# Patient Record
Sex: FEMALE | Race: WHITE | Employment: FULL TIME
[De-identification: names, ages, dates, MRNs, and addresses within clinical notes are randomized per-mention and may not be internally consistent; named-entity substitution may affect disease eponyms.]

---

## 2017-02-03 ENCOUNTER — OFFICE VISIT (OUTPATIENT)
Dept: FAMILY MEDICINE CLINIC | Facility: CLINIC | Age: 42
End: 2017-02-03

## 2017-02-03 VITALS
SYSTOLIC BLOOD PRESSURE: 114 MMHG | HEART RATE: 84 BPM | RESPIRATION RATE: 16 BRPM | WEIGHT: 129.9 LBS | TEMPERATURE: 99 F | BODY MASS INDEX: 23.76 KG/M2 | DIASTOLIC BLOOD PRESSURE: 66 MMHG

## 2017-02-03 DIAGNOSIS — J06.9 UPPER RESPIRATORY TRACT INFECTION, UNSPECIFIED TYPE: ICD-10-CM

## 2017-02-03 DIAGNOSIS — J01.40 ACUTE NON-RECURRENT PANSINUSITIS: Primary | ICD-10-CM

## 2017-02-03 LAB
INFLUENZA A ANTIBODY: NORMAL
INFLUENZA B ANTIBODY: NORMAL

## 2017-02-03 PROCEDURE — 87804 INFLUENZA ASSAY W/OPTIC: CPT | Performed by: NURSE PRACTITIONER

## 2017-02-03 PROCEDURE — 99213 OFFICE O/P EST LOW 20 MIN: CPT | Performed by: NURSE PRACTITIONER

## 2017-02-03 RX ORDER — AMOXICILLIN AND CLAVULANATE POTASSIUM 875; 125 MG/1; MG/1
1 TABLET, FILM COATED ORAL 2 TIMES DAILY
Qty: 20 TABLET | Refills: 0 | Status: SHIPPED | OUTPATIENT
Start: 2017-02-03 | End: 2018-01-08 | Stop reason: SDUPTHER

## 2017-02-03 ASSESSMENT — ENCOUNTER SYMPTOMS
SINUS PAIN: 1
VOMITING: 0
SORE THROAT: 1
DIARRHEA: 0
RHINORRHEA: 1
COUGH: 1
NAUSEA: 0

## 2017-02-06 ASSESSMENT — ENCOUNTER SYMPTOMS
SHORTNESS OF BREATH: 0
EYE ITCHING: 0
EYE PAIN: 0
SINUS PRESSURE: 1
WHEEZING: 0
ABDOMINAL PAIN: 0

## 2017-02-14 ENCOUNTER — TELEPHONE (OUTPATIENT)
Dept: FAMILY MEDICINE CLINIC | Facility: CLINIC | Age: 42
End: 2017-02-14

## 2017-02-15 ENCOUNTER — TELEPHONE (OUTPATIENT)
Dept: FAMILY MEDICINE CLINIC | Facility: CLINIC | Age: 42
End: 2017-02-15

## 2017-02-15 DIAGNOSIS — B96.89 ACUTE BACTERIAL RHINOSINUSITIS: Primary | ICD-10-CM

## 2017-02-15 DIAGNOSIS — J01.90 ACUTE BACTERIAL RHINOSINUSITIS: Primary | ICD-10-CM

## 2017-02-16 RX ORDER — AZITHROMYCIN 250 MG/1
TABLET, FILM COATED ORAL
Qty: 6 TABLET | Refills: 0 | Status: SHIPPED | OUTPATIENT
Start: 2017-02-16 | End: 2017-02-26

## 2018-01-08 ENCOUNTER — OFFICE VISIT (OUTPATIENT)
Dept: FAMILY MEDICINE CLINIC | Age: 43
End: 2018-01-08
Payer: COMMERCIAL

## 2018-01-08 VITALS
HEIGHT: 62 IN | HEART RATE: 66 BPM | WEIGHT: 129.6 LBS | TEMPERATURE: 97.8 F | SYSTOLIC BLOOD PRESSURE: 112 MMHG | DIASTOLIC BLOOD PRESSURE: 77 MMHG | BODY MASS INDEX: 23.85 KG/M2 | RESPIRATION RATE: 20 BRPM

## 2018-01-08 DIAGNOSIS — J01.40 ACUTE NON-RECURRENT PANSINUSITIS: Primary | ICD-10-CM

## 2018-01-08 DIAGNOSIS — R05.9 COUGH: ICD-10-CM

## 2018-01-08 PROCEDURE — 99213 OFFICE O/P EST LOW 20 MIN: CPT | Performed by: NURSE PRACTITIONER

## 2018-01-08 RX ORDER — AMOXICILLIN AND CLAVULANATE POTASSIUM 875; 125 MG/1; MG/1
1 TABLET, FILM COATED ORAL 2 TIMES DAILY
Qty: 20 TABLET | Refills: 0 | Status: SHIPPED | OUTPATIENT
Start: 2018-01-08 | End: 2018-01-18

## 2018-01-08 RX ORDER — BENZONATATE 100 MG/1
100 CAPSULE ORAL EVERY 6 HOURS PRN
Qty: 30 CAPSULE | Refills: 0 | Status: SHIPPED | OUTPATIENT
Start: 2018-01-08 | End: 2018-01-18

## 2018-01-08 ASSESSMENT — ENCOUNTER SYMPTOMS
SINUS PRESSURE: 1
EYES NEGATIVE: 1
SINUS PAIN: 1
SHORTNESS OF BREATH: 0
COUGH: 1
DIARRHEA: 0
HOARSE VOICE: 1
SORE THROAT: 1
ABDOMINAL PAIN: 0
NAUSEA: 0

## 2018-01-08 ASSESSMENT — PATIENT HEALTH QUESTIONNAIRE - PHQ9
SUM OF ALL RESPONSES TO PHQ9 QUESTIONS 1 & 2: 0
SUM OF ALL RESPONSES TO PHQ QUESTIONS 1-9: 0
2. FEELING DOWN, DEPRESSED OR HOPELESS: 0
1. LITTLE INTEREST OR PLEASURE IN DOING THINGS: 0

## 2018-01-08 NOTE — PROGRESS NOTES
Subjective:      Patient ID: Anastacia Richards is a 38 y.o. female. Visit Information    Have you changed or started any medications since your last visit including any over-the-counter medicines, vitamins, or herbal medicines? no   Are you having any side effects from any of your medications? -  no  Have you stopped taking any of your medications? Is so, why? -  no    Have you seen any other physician or provider since your last visit? No  Have you had any other diagnostic tests since your last visit? No  Have you been seen in the emergency room and/or had an admission to a hospital since we last saw you? No    Have you activated your [facility] account? If not, what are your barriers? Yes     Patient Care Team:  Valentino Mayers, MD as PCP - General (Pediatrics)    Medical History Review  Past Medical, Family, and Social History reviewed and does contribute to the patient presenting condition    Health Maintenance   Topic Date Due    HIV screen  08/27/1988    Lipid screen  08/27/2013    Cervical cancer screen  03/01/2014    TSH testing  09/24/2015    DTaP/Tdap/Td vaccine (1 - Tdap) 11/01/2018 (Originally 8/27/1992)    Flu vaccine (1) 11/01/2018 (Originally 9/1/2017)       Sinusitis   This is a new problem. The current episode started 1 to 4 weeks ago. The problem has been gradually worsening since onset. There has been no fever. The pain is mild. Associated symptoms include congestion, coughing, ear pain, headaches, a hoarse voice, sinus pressure and a sore throat (from coughing). Pertinent negatives include no chills or shortness of breath (a little). Treatments tried: sudafed and mucinex. The treatment provided mild relief. Review of Systems   Constitutional: Positive for activity change and fatigue. Negative for appetite change and chills. HENT: Positive for congestion, ear pain, hoarse voice, postnasal drip, sinus pain, sinus pressure and sore throat (from coughing). Eyes: Negative.     Respiratory: Positive for cough. Negative for shortness of breath (a little). Cardiovascular: Negative for chest pain and palpitations. Gastrointestinal: Negative for abdominal pain, diarrhea and nausea. Genitourinary: Negative. Skin: Negative for rash. Neurological: Positive for headaches. Negative for dizziness and syncope. Psychiatric/Behavioral: Positive for sleep disturbance. Objective:   Physical Exam   Constitutional: She is oriented to person, place, and time. She appears well-developed. No distress. HENT:   Head: Normocephalic and atraumatic. Right Ear: Hearing, tympanic membrane, external ear and ear canal normal.   Left Ear: Hearing, tympanic membrane, external ear and ear canal normal.   Nose: Mucosal edema, rhinorrhea and sinus tenderness present. Right sinus exhibits maxillary sinus tenderness. Right sinus exhibits no frontal sinus tenderness. Left sinus exhibits maxillary sinus tenderness. Left sinus exhibits no frontal sinus tenderness. Mouth/Throat: Uvula is midline, oropharynx is clear and moist and mucous membranes are normal. No oropharyngeal exudate. Eyes: Conjunctivae are normal. Pupils are equal, round, and reactive to light. Right eye exhibits no discharge. Left eye exhibits no discharge. Neck: Neck supple. Cardiovascular: Normal rate, regular rhythm and normal heart sounds. Pulmonary/Chest: Effort normal and breath sounds normal. No respiratory distress. She has no wheezes. She has no rhonchi. Dry cough in office today   Lymphadenopathy:     She has no cervical adenopathy. Neurological: She is alert and oriented to person, place, and time. Skin: Skin is warm and dry. Psychiatric: She has a normal mood and affect. Her behavior is normal.   Nursing note and vitals reviewed. Assessment:      1. Acute non-recurrent pansinusitis  amoxicillin-clavulanate (AUGMENTIN) 875-125 MG per tablet   2.  Cough  benzonatate (TESSALON PERLES) 100 MG capsule         Plan:

## 2018-01-08 NOTE — PATIENT INSTRUCTIONS
hot, wet towel or a warm gel pack on your face 3 or 4 times a day for 5 to 10 minutes each time. · Try a decongestant nasal spray like oxymetazoline (Afrin). Do not use it for more than 3 days in a row. Using it for more than 3 days can make your congestion worse. When should you call for help? Call your doctor now or seek immediate medical care if:  ? · You have new or worse swelling or redness in your face or around your eyes. ? · You have a new or higher fever. ? Watch closely for changes in your health, and be sure to contact your doctor if:  ? · You have new or worse facial pain. ? · The mucus from your nose becomes thicker (like pus) or has new blood in it. ? · You are not getting better as expected. Where can you learn more? Go to https://Joustpepiceweb.SageQuest. org and sign in to your [employer] account. Enter P793 in the Meizu box to learn more about \"Sinusitis: Care Instructions. \"     If you do not have an account, please click on the \"Sign Up Now\" link. Current as of: May 12, 2017  Content Version: 11.5  © 3057-9113 [employer]. Care instructions adapted under license by [facility] ([facility]). If you have questions about a medical condition or this instruction, always ask your healthcare professional. [place] [address] any warranty or liability for your use of this information. Patient Education        Saline Nasal Washes: Care Instructions  Your Care Instructions  Saline nasal washes help keep the nasal passages open by washing out thick or dried mucus. This simple remedy can help relieve symptoms of allergies, sinusitis, and colds. It also can make the nose feel more comfortable by keeping the mucous membranes moist. You may notice a little burning sensation in your nose the first few times you use the solution, but this usually gets better in a few days. Follow-up care is a key part of your treatment and safety.  Be sure to make and go to all appointments, and call your doctor if you are having problems. It's also a good idea to know your test results and keep a list of the medicines you take. How can you care for yourself at home? · You can buy premixed saline solution in a squeeze bottle or other sinus rinse products at a drugstore. Read and follow the instructions on the label. · You also can make your own saline solution by adding 1 teaspoon of salt and 1 teaspoon of baking soda to 2 cups of distilled water. · If you use a homemade solution, pour a small amount into a clean bowl. Using a rubber bulb syringe, squeeze the syringe and place the tip in the salt water. Pull a small amount of the salt water into the syringe by relaxing your hand. · Sit down with your head tilted slightly back. Do not lie down. Put the tip of the bulb syringe or the squeeze bottle a little way into one of your nostrils. Gently drip or squirt a few drops into the nostril. Repeat with the other nostril. Some sneezing and gagging are normal at first.  · Gently blow your nose. · Wipe the syringe or bottle tip clean after each use. · Repeat this 2 or 3 times a day. · Use nasal washes gently if you have nosebleeds often. When should you call for help? Watch closely for changes in your health, and be sure to contact your doctor if:  ? · You often get nosebleeds. ? · You have problems doing the nasal washes. Where can you learn more? Go to https://SiTimepeconcetta.LoanHero. org and sign in to your Re-Compose account. Enter [phone] [id number] [phone] in the [employer] box to learn more about \"Saline Nasal Washes: Care Instructions. \"     If you do not have an account, please click on the \"Sign Up Now\" link. Current as of: May 12, 2017  Content Version: 11.5  © 2575-0032 [employer]orporated. Care instructions adapted under license by [facility] ([facility]).  If you have questions about a medical condition or this instruction, always ask your healthcare professional. [employer] disclaims any warranty or liability for your use of this information.

## 2018-01-22 ENCOUNTER — TELEPHONE (OUTPATIENT)
Dept: FAMILY MEDICINE CLINIC | Age: 43
End: 2018-01-22

## 2018-01-22 DIAGNOSIS — Z20.828 EXPOSURE TO INFLUENZA: Primary | ICD-10-CM

## 2018-01-22 RX ORDER — OSELTAMIVIR PHOSPHATE 75 MG/1
75 CAPSULE ORAL DAILY
Qty: 10 CAPSULE | Refills: 0 | Status: SHIPPED | OUTPATIENT
Start: 2018-01-22 | End: 2018-02-01

## 2018-09-13 ENCOUNTER — OFFICE VISIT (OUTPATIENT)
Dept: FAMILY MEDICINE CLINIC | Age: 43
End: 2018-09-13
Payer: COMMERCIAL

## 2018-09-13 ENCOUNTER — HOSPITAL ENCOUNTER (OUTPATIENT)
Age: 43
Setting detail: SPECIMEN
Discharge: HOME | End: 2018-09-13
Payer: COMMERCIAL

## 2018-09-13 VITALS
WEIGHT: 133 LBS | BODY MASS INDEX: 24.33 KG/M2 | RESPIRATION RATE: 18 BRPM | HEART RATE: 72 BPM | SYSTOLIC BLOOD PRESSURE: 122 MMHG | TEMPERATURE: 97.8 F | DIASTOLIC BLOOD PRESSURE: 78 MMHG

## 2018-09-13 DIAGNOSIS — F41.9 ANXIETY AND DEPRESSION: ICD-10-CM

## 2018-09-13 DIAGNOSIS — B37.31 YEAST VAGINITIS: Primary | ICD-10-CM

## 2018-09-13 DIAGNOSIS — E89.0 HYPOTHYROIDISM FOLLOWING RADIOIODINE THERAPY: ICD-10-CM

## 2018-09-13 DIAGNOSIS — N89.8 VAGINAL DISCHARGE: ICD-10-CM

## 2018-09-13 DIAGNOSIS — F32.A ANXIETY AND DEPRESSION: ICD-10-CM

## 2018-09-13 DIAGNOSIS — B37.31 YEAST VAGINITIS: ICD-10-CM

## 2018-09-13 LAB
DIRECT EXAM: ABNORMAL
Lab: ABNORMAL
SPECIMEN DESCRIPTION: ABNORMAL
STATUS: ABNORMAL

## 2018-09-13 PROCEDURE — 99214 OFFICE O/P EST MOD 30 MIN: CPT | Performed by: NURSE PRACTITIONER

## 2018-09-13 RX ORDER — FLUCONAZOLE 150 MG/1
150 TABLET ORAL ONCE
Qty: 2 TABLET | Refills: 0 | Status: SHIPPED | OUTPATIENT
Start: 2018-09-13 | End: 2018-09-13

## 2018-09-13 RX ORDER — FLUOXETINE HYDROCHLORIDE 20 MG/1
20 CAPSULE ORAL DAILY
Qty: 30 CAPSULE | Refills: 3 | Status: SHIPPED | OUTPATIENT
Start: 2018-09-13

## 2018-09-13 NOTE — PATIENT INSTRUCTIONS
play a relaxation tape while you drive a car. When should you call for help? Call 911 anytime you think you may need emergency care. For example, call if:    · You feel you cannot stop from hurting yourself or someone else.   Amari Garcia the numbers for these national suicide hotlines: 5-142-528-TALK (5-565.453.2830) and 8-012-NNWIEIK (0-568.512.4737). If you or someone you know talks about suicide or feeling hopeless, get help right away.   Watch closely for changes in your health, and be sure to contact your doctor if:    · You have anxiety or fear that affects your life.     · You have symptoms of anxiety that are new or different from those you had before. Where can you learn more? Go to https://University of Rochesterpericardoeweb.BlockTrail. org and sign in to your [employer] account. Enter P754 in the Schoolwires box to learn more about \"Anxiety Disorder: Care Instructions. \"     If you do not have an account, please click on the \"Sign Up Now\" link. Current as of: December 7, 2017  Content Version: 11.7  © 0811-1372 [employer]. Care instructions adapted under license by [facility] ([facility]). If you have questions about a medical condition or this instruction, always ask your healthcare professional. [address] any warranty or liability for your use of this information. Patient Education        Vaginal Yeast Infection: Care Instructions  Your Care Instructions    A vaginal yeast infection is caused by too many yeast cells in the vagina. This is common in women of all ages. Itching, vaginal discharge and irritation, and other symptoms can bother you. But yeast infections don't often cause other health problems. Some medicines can increase your risk of getting a yeast infection. These include antibiotics, birth control pills, hormones, and steroids. You may also be more likely to get a yeast infection if you are pregnant, have diabetes, douche, or wear tight clothes.   With treatment, most yeast infections get better in 2 to 3 days. Follow-up care is a key part of your treatment and safety. Be sure to make and go to all appointments, and call your doctor if you are having problems. It's also a good idea to know your test results and keep a list of the medicines you take. How can you care for yourself at home? · Take your medicines exactly as prescribed. Call your doctor if you think you are having a problem with your medicine. · Ask your doctor about over-the-counter (OTC) medicines for yeast infections. They may cost less than prescription medicines. If you use an OTC treatment, read and follow all instructions on the label. · Do not use tampons while using a vaginal cream or suppository. The tampons can absorb the medicine. Use pads instead. · Wear loose cotton clothing. Do not wear nylon or other fabric that holds body heat and moisture close to the skin. · Try sleeping without underwear. · Do not scratch. Relieve itching with a cold pack or a cool bath. · Do not wash your vaginal area more than once a day. Use plain water or a mild, unscented soap. Air-dry the vaginal area. · Change out of wet swimsuits after swimming. · Do not have sex until you have finished your treatment. · Do not douche. When should you call for help? Call your doctor now or seek immediate medical care if:    · You have unexpected vaginal bleeding.     · You have new or increased pain in your vagina or pelvis.    Watch closely for changes in your health, and be sure to contact your doctor if:    · You have a fever.     · You are not getting better after 2 days.     · Your symptoms come back after you finish your medicines. Where can you learn more? Go to https://Reframed.tvpepiceweb.Arriendas.cl. org and sign in to your [employer]ris account. Enter O970 in the Dreamzer Games box to learn more about \"Vaginal Yeast Infection: Care Instructions. \"     If you do not have an account, please click on the \"Sign Up Now\" link. Current as of: October 6, 2017  Content Version: 11.7  © 6897-0466 [employer]. Care instructions adapted under license by [address] If you have questions about a medical condition or this instruction, always ask your healthcare professional. [address] any warranty or liability for your use of this information.

## 2018-09-13 NOTE — PROGRESS NOTES
tablet by mouth 2 times daily for 7 days 14 tablet 0     No current facility-administered medications for this visit. HPI    Presents to the office concerns regarding vaginal discharge that started around 1.5 weeks ago. Denies any exposure to sexually transmitted infections. Denies any pelvic pain, back pain. Mild increase in vaginal odor. No abnormal bleeding. Does report an increase in the recent anxiety symptoms. States that she has had anxiety for quite some time, seems to be worse now. Would like to try something to help with this. Denies any thoughts of suicide or self-harm. Does have a history of hypothyroidism. Sees Dr. Joe Araya for endo. Did have recent labs completed. Reports thyroid is stable on current dose of medication. dwj    Review of Systems   Constitutional: Negative for chills, fatigue, fever and unexpected weight change. HENT: Negative for congestion, ear pain, postnasal drip, rhinorrhea and sore throat. Respiratory: Negative for cough, chest tightness, shortness of breath and wheezing. Cardiovascular: Negative for chest pain and palpitations. Gastrointestinal: Negative for abdominal pain, constipation, diarrhea, nausea and vomiting. Endocrine: Negative for polydipsia and polyuria. Hypothyroidism-follows with endocrinology   Genitourinary: Positive for vaginal discharge. Negative for dysuria, frequency, hematuria and urgency. Musculoskeletal: Negative for back pain, gait problem, myalgias and neck stiffness. Skin: Negative for rash and wound. Allergic/Immunologic: Negative for environmental allergies and food allergies. Neurological: Negative for dizziness, seizures, syncope, weakness and headaches. Hematological: Negative for adenopathy. Psychiatric/Behavioral: Negative for dysphoric mood, self-injury and suicidal ideas. The patient is nervous/anxious. Objective:   Physical Exam   Constitutional: She is oriented to person, place, and time. reviewed. Assessment / Plan:     1. Yeast vaginitis  Pelvic exam complete, tolerated well  Start Diflucan. Await for vaginitis DNA probe results to treat for possible bacterial vaginosis. Continue to monitor symptoms. Follow-up in office as needed. - VAGINITIS DNA PROBE; Future  - fluconazole (DIFLUCAN) 150 MG tablet; Take 1 tablet by mouth once for 1 dose May repeat in 72 hours if needed  Dispense: 2 tablet; Refill: 0    2. Vaginal discharge  Pelvic exam complete, tolerated well  Start Diflucan. Await for vaginitis DNA probe results to treat for possible bacterial vaginosis. Continue to monitor symptoms. Follow-up in office as needed. - VAGINITIS DNA PROBE; Future  - fluconazole (DIFLUCAN) 150 MG tablet; Take 1 tablet by mouth once for 1 dose May repeat in 72 hours if needed  Dispense: 2 tablet; Refill: 0    3. Anxiety and depression  Trial daily Prozac. Risks/benefits of medication discussed. Monitor for worsening symptoms-see community medical attention with any thoughts of suicide or self-harm. Return to office in 1 month for evaluation.  - FLUoxetine (PROZAC) 20 MG capsule; Take 1 capsule by mouth daily  Dispense: 30 capsule; Refill: 3    4. Hypothyroidism following radioiodine therapy  Stable, continue current medications, continue monitoring. Maintain follow-up appointment with endocrinology as planned. Obtain records from recent endocrinology visit    Encouraged dietary, exercise, continue current medications. Maintain up appointments as planned. Recommend influenza vaccine in the next few weeks. Return in about 4 weeks (around 10/11/2018), or if symptoms worsen or fail to improve, for anxiety/depression med check. William De aL Cruz received counseling on the following healthy behaviors: nutrition, exercise, medication adherence and tobacco cessation  Reviewed prior labs and health maintenance. Continue current medications, diet and exercise.   Discussed use, benefit, and side effects of prescribed medications. Barriers to medication compliance addressed. Patient given educational materials - see patient instructions. All patient questions answered. Patient voiced understanding.        Electronically signed by OLI Mendiola CNP on 9/19/2018 at 1:33 PM

## 2018-09-14 ENCOUNTER — TELEPHONE (OUTPATIENT)
Dept: FAMILY MEDICINE CLINIC | Age: 43
End: 2018-09-14

## 2018-09-14 DIAGNOSIS — B96.89 BV (BACTERIAL VAGINOSIS): Primary | ICD-10-CM

## 2018-09-14 DIAGNOSIS — N76.0 BV (BACTERIAL VAGINOSIS): Primary | ICD-10-CM

## 2018-09-14 RX ORDER — METRONIDAZOLE 500 MG/1
500 TABLET ORAL 2 TIMES DAILY
Qty: 14 TABLET | Refills: 0 | Status: SHIPPED | OUTPATIENT
Start: 2018-09-14 | End: 2018-09-21

## 2018-09-19 ASSESSMENT — ENCOUNTER SYMPTOMS
SORE THROAT: 0
DIARRHEA: 0
SHORTNESS OF BREATH: 0
CHEST TIGHTNESS: 0
BACK PAIN: 0
WHEEZING: 0
VOMITING: 0
COUGH: 0
CONSTIPATION: 0
ABDOMINAL PAIN: 0
RHINORRHEA: 0
NAUSEA: 0

## 2018-12-13 ENCOUNTER — E-VISIT (OUTPATIENT)
Dept: FAMILY MEDICINE CLINIC | Age: 43
End: 2018-12-13
Payer: COMMERCIAL

## 2018-12-13 DIAGNOSIS — J01.90 ACUTE BACTERIAL SINUSITIS: Primary | ICD-10-CM

## 2018-12-13 DIAGNOSIS — B96.89 ACUTE BACTERIAL SINUSITIS: Primary | ICD-10-CM

## 2018-12-13 PROCEDURE — 99444 PR PHYSICIAN ONLINE EVALUATION & MANAGEMENT SERVICE: CPT | Performed by: FAMILY MEDICINE

## 2018-12-13 RX ORDER — AMOXICILLIN AND CLAVULANATE POTASSIUM 875; 125 MG/1; MG/1
1 TABLET, FILM COATED ORAL 2 TIMES DAILY WITH MEALS
Qty: 20 TABLET | Refills: 0 | Status: SHIPPED | OUTPATIENT
Start: 2018-12-13 | End: 2018-12-23

## 2018-12-13 ASSESSMENT — LIFESTYLE VARIABLES: SMOKING_STATUS: NO

## 2019-03-13 ENCOUNTER — E-VISIT (OUTPATIENT)
Dept: FAMILY MEDICINE CLINIC | Age: 44
End: 2019-03-13
Payer: COMMERCIAL

## 2019-03-13 DIAGNOSIS — J01.90 ACUTE BACTERIAL SINUSITIS: Primary | ICD-10-CM

## 2019-03-13 DIAGNOSIS — B96.89 ACUTE BACTERIAL SINUSITIS: Primary | ICD-10-CM

## 2019-03-13 PROCEDURE — 98969 PR NONPHYSICIAN ONLINE ASSESSMENT AND MANAGEMENT: CPT | Performed by: NURSE PRACTITIONER

## 2019-03-13 RX ORDER — AMOXICILLIN AND CLAVULANATE POTASSIUM 875; 125 MG/1; MG/1
1 TABLET, FILM COATED ORAL 2 TIMES DAILY WITH MEALS
Qty: 20 TABLET | Refills: 0 | Status: SHIPPED | OUTPATIENT
Start: 2019-03-13 | End: 2019-03-23

## 2019-03-13 ASSESSMENT — LIFESTYLE VARIABLES: SMOKING_STATUS: NO, I'VE NEVER SMOKED

## 2020-03-09 ENCOUNTER — E-VISIT (OUTPATIENT)
Dept: INTERNAL MEDICINE CLINIC | Age: 45
End: 2020-03-09
Payer: COMMERCIAL

## 2020-03-09 PROCEDURE — 99421 OL DIG E/M SVC 5-10 MIN: CPT | Performed by: INTERNAL MEDICINE

## 2020-03-09 RX ORDER — AMOXICILLIN AND CLAVULANATE POTASSIUM 875; 125 MG/1; MG/1
1 TABLET, FILM COATED ORAL 2 TIMES DAILY
Qty: 14 TABLET | Refills: 0 | Status: SHIPPED | OUTPATIENT
Start: 2020-03-09 | End: 2020-03-16

## 2020-03-09 ASSESSMENT — LIFESTYLE VARIABLES: SMOKING_STATUS: NO, I'VE NEVER SMOKED

## 2020-08-17 ENCOUNTER — OFFICE VISIT (OUTPATIENT)
Dept: FAMILY MEDICINE CLINIC | Age: 45
End: 2020-08-17
Payer: COMMERCIAL

## 2020-08-17 VITALS
SYSTOLIC BLOOD PRESSURE: 94 MMHG | DIASTOLIC BLOOD PRESSURE: 60 MMHG | WEIGHT: 133 LBS | TEMPERATURE: 97.3 F | OXYGEN SATURATION: 98 % | BODY MASS INDEX: 24.48 KG/M2 | HEIGHT: 62 IN | HEART RATE: 72 BPM

## 2020-08-17 PROCEDURE — 99213 OFFICE O/P EST LOW 20 MIN: CPT | Performed by: NURSE PRACTITIONER

## 2020-08-17 SDOH — ECONOMIC STABILITY: FOOD INSECURITY: WITHIN THE PAST 12 MONTHS, YOU WORRIED THAT YOUR FOOD WOULD RUN OUT BEFORE YOU GOT MONEY TO BUY MORE.: NEVER TRUE

## 2020-08-17 SDOH — ECONOMIC STABILITY: INCOME INSECURITY: HOW HARD IS IT FOR YOU TO PAY FOR THE VERY BASICS LIKE FOOD, HOUSING, MEDICAL CARE, AND HEATING?: NOT HARD AT ALL

## 2020-08-17 SDOH — ECONOMIC STABILITY: FOOD INSECURITY: WITHIN THE PAST 12 MONTHS, THE FOOD YOU BOUGHT JUST DIDN'T LAST AND YOU DIDN'T HAVE MONEY TO GET MORE.: NEVER TRUE

## 2020-08-17 SDOH — ECONOMIC STABILITY: TRANSPORTATION INSECURITY
IN THE PAST 12 MONTHS, HAS THE LACK OF TRANSPORTATION KEPT YOU FROM MEDICAL APPOINTMENTS OR FROM GETTING MEDICATIONS?: NO

## 2020-08-17 SDOH — ECONOMIC STABILITY: TRANSPORTATION INSECURITY
IN THE PAST 12 MONTHS, HAS LACK OF TRANSPORTATION KEPT YOU FROM MEETINGS, WORK, OR FROM GETTING THINGS NEEDED FOR DAILY LIVING?: NO

## 2020-08-17 ASSESSMENT — ENCOUNTER SYMPTOMS
ROS SKIN COMMENTS: LUMP IN LEFT BREAST
COUGH: 0
COLOR CHANGE: 0
SHORTNESS OF BREATH: 0

## 2020-08-17 ASSESSMENT — PATIENT HEALTH QUESTIONNAIRE - PHQ9
1. LITTLE INTEREST OR PLEASURE IN DOING THINGS: 0
SUM OF ALL RESPONSES TO PHQ QUESTIONS 1-9: 0
SUM OF ALL RESPONSES TO PHQ QUESTIONS 1-9: 0
2. FEELING DOWN, DEPRESSED OR HOPELESS: 0
SUM OF ALL RESPONSES TO PHQ9 QUESTIONS 1 & 2: 0

## 2020-08-17 NOTE — PROGRESS NOTES
Subjective:      Visit Information    Have you changed or started any medications since your last visit including any over-the-counter medicines, vitamins, or herbal medicines? no   Are you having any side effects from any of your medications? -  no  Have you stopped taking any of your medications? Is so, why? -  no    Have you seen any other physician or provider since your last visit? No  Have you had any other diagnostic tests since your last visit? No  Have you been seen in the emergency room and/or had an admission to a hospital since we last saw you? No  Have you had your routine dental cleaning in the past 6 months? no    Have you activated your tweetTV account? If not, what are your barriers? Yes     Patient Care Team:  Daniella Thacker MD as PCP - General    Medical History Review  Past Medical, Family, and Social History reviewed and does contribute to the patient presenting condition    Health Maintenance   Topic Date Due    Lipid screen  08/27/2013    TSH testing  09/24/2015    Cervical cancer screen  10/09/2020 (Originally 3/1/2014)    DTaP/Tdap/Td vaccine (1 - Tdap) 08/17/2021 (Originally 8/27/1992)    Flu vaccine (1) 09/01/2020    Hepatitis A vaccine  Aged Out    Hepatitis B vaccine  Aged Out    Hib vaccine  Aged Out    Meningococcal (ACWY) vaccine  Aged Out    Pneumococcal 0-64 years Vaccine  Aged Out    HIV screen  Discontinued       Current Outpatient Medications   Medication Sig Dispense Refill    levothyroxine (SYNTHROID) 112 MCG tablet Take 112 mcg by mouth daily.  FLUoxetine (PROZAC) 20 MG capsule Take 1 capsule by mouth daily (Patient not taking: Reported on 8/17/2020) 30 capsule 3     No current facility-administered medications for this visit. Patient ID: Angie Whittaker is a 53 y.o. female. Patient presents in office today with concerns about lump in her left breast. Noticed on Thursday. Unchanged since last week. Non-tender.  Breasts gross her out so stopped touching it. LMP- ended last week. Usually has cycle every 3 weeks. Has only ever had 1 mammogram but it has been several years. Review of Systems   Constitutional: Negative for activity change, appetite change and fever. Respiratory: Negative for cough and shortness of breath. Cardiovascular: Negative for chest pain. Skin: Negative for color change and rash. Lump in left breast   Psychiatric/Behavioral: Negative for sleep disturbance. The patient is nervous/anxious. Objective:     BP 94/60   Pulse 72   Temp 97.3 °F (36.3 °C) (Temporal)   Ht 5' 2\" (1.575 m)   Wt 133 lb (60.3 kg)   LMP 08/05/2020   SpO2 98%   BMI 24.33 kg/m²      Physical Exam  Vitals signs and nursing note reviewed. Constitutional:       Appearance: Normal appearance. She is not ill-appearing or diaphoretic. HENT:      Head: Normocephalic and atraumatic. Right Ear: External ear normal.      Left Ear: External ear normal.   Neck:      Musculoskeletal: Neck supple. Cardiovascular:      Rate and Rhythm: Normal rate and regular rhythm. Pulmonary:      Effort: Pulmonary effort is normal. No respiratory distress. Breath sounds: Normal breath sounds. No wheezing. Chest:      Breasts:         Right: Normal. No swelling, bleeding, inverted nipple, mass, nipple discharge, skin change or tenderness. Left: Mass (pea sized mobile mass noted at 4 o'clock position, 3 finger breadths from nipple) present. No swelling, bleeding, inverted nipple, nipple discharge, skin change or tenderness. Lymphadenopathy:      Upper Body:      Right upper body: No axillary adenopathy. Left upper body: No axillary adenopathy. Skin:     General: Skin is warm and dry. Findings: No rash. Neurological:      Mental Status: She is alert and oriented to person, place, and time. Psychiatric:         Mood and Affect: Mood normal.         Behavior: Behavior normal.         Assessment / Plan:     1.  Lump of left breast    - TAMMIE DIGITAL DIAGNOSTIC W OR WO CAD BILATERAL; Future  - US BREAST COMPLETE LEFT; Future    Has not had mammogram in several years. Will complete diagnostic mammogram and ultrasound further evaluation. Further plan of care based on results. Call office with concerns          Samuel Sandifer received counseling on the following healthy behaviors: nutrition and exercise  Reviewed prior labs and health maintenance. Continue current medications, diet and exercise. Discussed use, benefit, and side effects of prescribed medications. Barriers to medication compliance addressed. Patient given educational materials - see patient instructions. All patient questions answered. Patient voiced understanding.        Electronically signed by OLI Bang CNP on 8/17/2020 at 4:38 PM

## 2021-04-28 ENCOUNTER — E-VISIT (OUTPATIENT)
Dept: FAMILY MEDICINE CLINIC | Age: 46
End: 2021-04-28
Payer: COMMERCIAL

## 2021-04-28 DIAGNOSIS — J06.9 ACUTE UPPER RESPIRATORY INFECTION, UNSPECIFIED: ICD-10-CM

## 2021-04-28 DIAGNOSIS — J01.90 ACUTE NON-RECURRENT SINUSITIS, UNSPECIFIED LOCATION: Primary | ICD-10-CM

## 2021-04-28 PROCEDURE — 99421 OL DIG E/M SVC 5-10 MIN: CPT | Performed by: PHYSICIAN ASSISTANT

## 2021-04-28 RX ORDER — AMOXICILLIN AND CLAVULANATE POTASSIUM 875; 125 MG/1; MG/1
1 TABLET, FILM COATED ORAL 2 TIMES DAILY
Qty: 20 TABLET | Refills: 0 | Status: SHIPPED | OUTPATIENT
Start: 2021-04-28 | End: 2021-05-08

## 2021-04-28 ASSESSMENT — LIFESTYLE VARIABLES: SMOKING_STATUS: NO, I'VE NEVER SMOKED

## 2021-04-28 NOTE — PROGRESS NOTES
21  [facility] : 1975 Sex: female  Age: 50 y.o. No chief complaint on file. HPI:  50 y.o. female presents for acute visit due to sinus/cough/cold. The patient has had nasal congestion that is mostly a thin and yellow/green color. The patient does have some pressure around teeth and in the face. No sore throat. No swollen glands. The patient has been sneezing, coughing. Infrequent coughing and coughing up a little bit of mucus. The patient is swallowing everything that is coughed up. No hoarseness. The patient is ears have some fluid like sensation and ache. No eye pain, body aches, stiff neck. No nausea, vomiting, dizziness. No swelling of the mouth, tongue, or difficulty swallowing. No shortness of breath or chest pain. No rashes. Patient thought that this was related to allergies but is been 7 days and not improving. Patient is not a smoker. No significant past medical conditions. Has not been recently rehospitalized. The patient has been exposed to other people with similar symptoms. The patient has had similar presentation in the past.  The patient is not having any fevers or chills. History:    Current Outpatient Medications:     FLUoxetine (PROZAC) 20 MG capsule, Take 1 capsule by mouth daily (Patient not taking: Reported on 2020), Disp: 30 capsule, Rfl: 3    levothyroxine (SYNTHROID) 112 MCG tablet, Take 112 mcg by mouth daily. , Disp: , Rfl:     No Known Allergies    Past Medical History:   Diagnosis Date    Allergic rhinitis     Hypothyroidism following radioiodine therapy     Sinusitis      No past surgical history on file. No family history on file. Social History     Tobacco History     Smoking Status  Former Smoker    Smokeless Tobacco Use  Never Used            No vital signs for this E-visit    Past medical history reviewed. Allergies reviewed. Medications reviewed.     Reviewing the patient's questionnaire this does seem to be consistent with a sinus type infection. Is been ongoing for a week. We will treat the patient with Augmentin. We will update the patient via Rambus message. The patient is to push fluids get plenty of rest.  We will have the patient follow-up for repeat evaluation with PCP if the symptoms do not improve in for formal testing. Patient may use over-the-counter decongestant, nasal spray. Use probiotics. Assessment and Plan:  Diagnoses and all orders for this visit:    Acute non-recurrent sinusitis, unspecified location    Acute upper respiratory infection, unspecified    Other orders  -     amoxicillin-clavulanate (AUGMENTIN) 875-125 MG per tablet; Take 1 tablet by mouth 2 times daily for 10 days          Micky Handley IIIma  4/28/21  12:58 PM 5-10 minutes were spent on the digital evaluation and management of this patient.

## 2022-05-23 ENCOUNTER — E-VISIT (OUTPATIENT)
Dept: PRIMARY CARE CLINIC | Age: 47
End: 2022-05-23
Payer: COMMERCIAL

## 2022-05-23 DIAGNOSIS — J06.9 UPPER RESPIRATORY TRACT INFECTION, UNSPECIFIED TYPE: Primary | ICD-10-CM

## 2022-05-23 PROCEDURE — 99213 OFFICE O/P EST LOW 20 MIN: CPT | Performed by: NURSE PRACTITIONER

## 2022-05-23 RX ORDER — AMOXICILLIN AND CLAVULANATE POTASSIUM 875; 125 MG/1; MG/1
1 TABLET, FILM COATED ORAL 2 TIMES DAILY
Qty: 20 TABLET | Refills: 0 | Status: SHIPPED | OUTPATIENT
Start: 2022-05-23 | End: 2022-06-02

## 2022-05-23 ASSESSMENT — LIFESTYLE VARIABLES: SMOKING_STATUS: NO, I'VE NEVER SMOKED

## 2022-12-08 ENCOUNTER — OFFICE VISIT (OUTPATIENT)
Dept: PRIMARY CARE CLINIC | Age: 47
End: 2022-12-08
Payer: COMMERCIAL

## 2022-12-08 VITALS
DIASTOLIC BLOOD PRESSURE: 62 MMHG | OXYGEN SATURATION: 97 % | BODY MASS INDEX: 27.07 KG/M2 | HEART RATE: 68 BPM | SYSTOLIC BLOOD PRESSURE: 102 MMHG | WEIGHT: 148 LBS | TEMPERATURE: 98.9 F

## 2022-12-08 DIAGNOSIS — B34.9 VIRAL ILLNESS: Primary | ICD-10-CM

## 2022-12-08 DIAGNOSIS — R52 BODY ACHES: ICD-10-CM

## 2022-12-08 DIAGNOSIS — R05.1 ACUTE COUGH: ICD-10-CM

## 2022-12-08 LAB
INFLUENZA A ANTIBODY: NEGATIVE
INFLUENZA B ANTIBODY: NEGATIVE

## 2022-12-08 PROCEDURE — 99213 OFFICE O/P EST LOW 20 MIN: CPT | Performed by: NURSE PRACTITIONER

## 2022-12-08 PROCEDURE — 87804 INFLUENZA ASSAY W/OPTIC: CPT | Performed by: NURSE PRACTITIONER

## 2022-12-08 ASSESSMENT — ENCOUNTER SYMPTOMS
SHORTNESS OF BREATH: 0
EYE REDNESS: 0
SORE THROAT: 0
NAUSEA: 0
CHEST TIGHTNESS: 0
EYE DISCHARGE: 0
DIARRHEA: 0
COUGH: 1
CONSTIPATION: 0
ABDOMINAL PAIN: 0
VOMITING: 0
VOICE CHANGE: 0
WHEEZING: 0
RHINORRHEA: 1
SINUS PRESSURE: 1
EYE PAIN: 0
TROUBLE SWALLOWING: 0

## 2022-12-08 NOTE — PROGRESS NOTES
[address]. [place]  [address] [facility] [address]  Dept: [phone]    Ashley Vogel is a 50 y.o. female Established patient, who presents to the walk-in clinic today with conditions/complaints as noted below:    Chief Complaint   Patient presents with    Cough     Cough, nasal congestion/drainage, headache, body aches, chills, and right ear pain for 3 days. Denies sore throat, sob, chest pain, nausea, vomiting, or diarrhea. HPI:     Patient presents to walk in clinic with concerns about cough, body aches, headache and nasal drainage. Symptoms started Monday night. Was fine all day Monday then hit her hard Monday night. Has not checked her temperature but has felt warm and has had chills and sweats. Tested herself at home for Covid on Tuesday which was negative. Cough  This is a new problem. The current episode started in the past 7 days. Associated symptoms include chills, ear pain (right), a fever, headaches, myalgias, nasal congestion, postnasal drip, rhinorrhea and sweats. Pertinent negatives include no chest pain, eye redness, rash, sore throat, shortness of breath or wheezing. She has tried nothing for the symptoms. Her past medical history is significant for environmental allergies. Past Medical History:   Diagnosis Date    Allergic rhinitis     Hypothyroidism following radioiodine therapy     Sinusitis        Current Outpatient Medications   Medication Sig Dispense Refill    levothyroxine (SYNTHROID) 100 MCG tablet Take by mouth daily      FLUoxetine (PROZAC) 20 MG capsule Take 1 capsule by mouth daily (Patient not taking: No sig reported) 30 capsule 3     No current facility-administered medications for this visit. No Known Allergies    :     Review of Systems   Constitutional:  Positive for activity change, appetite change, chills, diaphoresis and fever.    HENT:  Positive for congestion, ear pain (right), postnasal drip, rhinorrhea and sinus pressure. Negative for ear discharge, sore throat, trouble swallowing and voice change. Eyes:  Negative for pain, discharge, redness and visual disturbance. Respiratory:  Positive for cough. Negative for chest tightness, shortness of breath and wheezing. Cardiovascular:  Negative for chest pain. Gastrointestinal:  Negative for abdominal pain, constipation, diarrhea, nausea and vomiting. Musculoskeletal:  Positive for myalgias. Skin:  Negative for rash. Allergic/Immunologic: Positive for environmental allergies. Neurological:  Positive for headaches. Psychiatric/Behavioral:  Positive for sleep disturbance.      :     /62 (Site: Left Upper Arm, Position: Sitting, Cuff Size: Medium Adult)   Pulse 68   Temp 98.9 °F (37.2 °C) (Tympanic)   Wt 148 lb (67.1 kg)   LMP 11/29/2022 (Approximate)   SpO2 97%   BMI 27.07 kg/m²     Physical Exam  Vitals and nursing note reviewed. Constitutional:       General: She is not in acute distress. Appearance: Normal appearance. She is ill-appearing. HENT:      Head: Normocephalic and atraumatic. Right Ear: Tympanic membrane and external ear normal.      Left Ear: Tympanic membrane and external ear normal.      Nose: Mucosal edema present. Right Sinus: Maxillary sinus tenderness and frontal sinus tenderness present. Left Sinus: Maxillary sinus tenderness and frontal sinus tenderness present. Mouth/Throat:      Mouth: Mucous membranes are moist.      Pharynx: Posterior oropharyngeal erythema present. No pharyngeal swelling or oropharyngeal exudate. Eyes:      General:         Right eye: No discharge. Left eye: No discharge. Conjunctiva/sclera: Conjunctivae normal.   Cardiovascular:      Rate and Rhythm: Normal rate and regular rhythm. Heart sounds: Normal heart sounds. Pulmonary:      Effort: Pulmonary effort is normal. No respiratory distress.       Breath sounds: Normal breath sounds. No stridor or decreased air movement. No wheezing, rhonchi or rales. Abdominal:      General: There is no distension. Palpations: Abdomen is soft. Tenderness: There is no abdominal tenderness. Musculoskeletal:      Cervical back: Neck supple. No tenderness. Skin:     General: Skin is warm and dry. Findings: No rash. Neurological:      Mental Status: She is alert and oriented to person, place, and time. Psychiatric:         Mood and Affect: Mood normal.         Behavior: Behavior normal.         :          1. Viral illness  2. Acute cough  -     POCT Influenza A/B- negative  3. Body aches  -     POCT Influenza A/B       She tested negative for Influenza A/B in office and had negative home Covid test on Tuesday. Educated likely viral etiology. Could still be Covid and she tested too soon. She is vaccinated so recommend she stay home from work today and tomorrow for quarantine. Recommend rest and adequate fluid intake. OTC Sudafed and Mucinex as needed. Monitor for worsening symptoms. Call office with concerns.   :      Return if symptoms worsen or fail to improve. No orders of the defined types were placed in this encounter. Patient and/or parent given educational materials - see patient instructions. Discussed use, benefit, and side effects of prescribed medications. All patient questions answered. Patient and/or parent voiced understanding.       Electronically signed by OLI Urrutia 12/8/2022 at 9:12 AM

## 2022-12-15 ENCOUNTER — OFFICE VISIT (OUTPATIENT)
Dept: PRIMARY CARE CLINIC | Age: 47
End: 2022-12-15
Payer: COMMERCIAL

## 2022-12-15 VITALS
SYSTOLIC BLOOD PRESSURE: 122 MMHG | WEIGHT: 148 LBS | DIASTOLIC BLOOD PRESSURE: 74 MMHG | BODY MASS INDEX: 27.07 KG/M2 | HEART RATE: 76 BPM | TEMPERATURE: 98.5 F | OXYGEN SATURATION: 98 %

## 2022-12-15 DIAGNOSIS — R05.1 ACUTE COUGH: ICD-10-CM

## 2022-12-15 DIAGNOSIS — B96.89 ACUTE BACTERIAL SINUSITIS: Primary | ICD-10-CM

## 2022-12-15 DIAGNOSIS — J01.90 ACUTE BACTERIAL SINUSITIS: Primary | ICD-10-CM

## 2022-12-15 PROCEDURE — 99213 OFFICE O/P EST LOW 20 MIN: CPT

## 2022-12-15 RX ORDER — AMOXICILLIN AND CLAVULANATE POTASSIUM 875; 125 MG/1; MG/1
1 TABLET, FILM COATED ORAL 2 TIMES DAILY
Qty: 20 TABLET | Refills: 0 | Status: SHIPPED | OUTPATIENT
Start: 2022-12-15 | End: 2022-12-25

## 2022-12-15 RX ORDER — BROMPHENIRAMINE MALEATE, PSEUDOEPHEDRINE HYDROCHLORIDE, AND DEXTROMETHORPHAN HYDROBROMIDE 2; 30; 10 MG/5ML; MG/5ML; MG/5ML
5 SYRUP ORAL 4 TIMES DAILY PRN
Qty: 100 ML | Refills: 0 | Status: SHIPPED | OUTPATIENT
Start: 2022-12-15 | End: 2022-12-20

## 2022-12-15 ASSESSMENT — ENCOUNTER SYMPTOMS
EYE REDNESS: 0
HEARTBURN: 0
SINUS PRESSURE: 1
SHORTNESS OF BREATH: 0
SINUS PAIN: 1
FACIAL SWELLING: 0
COUGH: 1
RHINORRHEA: 1
SORE THROAT: 1
HOARSE VOICE: 1

## 2022-12-15 NOTE — PROGRESS NOTES
[address] [facility]. [place]  [address]  [address] [address]  Dept: [phone]  Dept Fax: [phone]    Edmund Garcia is a 50 y.o. female who presents to the urgent care today for her medical conditions/complaints as notedbelow. Edmund Garcia is c/o of Headache (Started last Monday. Was seen last week. Has done all the otc medications and nothing is helping. ), Nasal Congestion, and Cough      HPI:     Patient was seen in the walk in on 12/8, Rapid Influenza neg & treated for a viral illness. Patient returns to the walk in today for worsening headache/congestion and cough. Cough  This is a new problem. Episode onset: in the past 2 weeks. The problem has been gradually worsening. The problem occurs constantly. Associated symptoms include ear congestion, ear pain, headaches, nasal congestion, postnasal drip, rhinorrhea and a sore throat. Pertinent negatives include no chest pain, chills, eye redness, fever, heartburn, rash, shortness of breath or sweats. She has tried OTC cough suppressant and rest for the symptoms. The treatment provided no relief. There is no history of asthma, bronchitis, COPD or pneumonia. Sinusitis  This is a new problem. Episode onset: in the past 7 to 10 days. The problem has been gradually worsening since onset. There has been no fever. Associated symptoms include congestion, coughing, ear pain, headaches, a hoarse voice, sinus pressure and a sore throat. Pertinent negatives include no chills, diaphoresis, neck pain, shortness of breath or sneezing. Treatments tried: OTC cold medicines. The treatment provided no relief.      Past Medical History:   Diagnosis Date    Allergic rhinitis     Hypothyroidism following radioiodine therapy     Sinusitis         Current Outpatient Medications   Medication Sig Dispense Refill    amoxicillin-clavulanate (AUGMENTIN) 875-125 MG per tablet Take 1 tablet by mouth 2 times daily for 10 days 20 tablet 0    brompheniramine-pseudoephedrine-DM 2-30-10 MG/5ML syrup Take 5 mLs by mouth 4 times daily as needed for Cough 100 mL 0    levothyroxine (SYNTHROID) 100 MCG tablet Take by mouth daily       No current facility-administered medications for this visit. No Known Allergies    Subjective:      Review of Systems   Constitutional:  Negative for chills, diaphoresis and fever. HENT:  Positive for congestion, ear pain, hoarse voice, postnasal drip, rhinorrhea, sinus pressure, sinus pain and sore throat. Negative for facial swelling and sneezing. Eyes:  Negative for redness. Respiratory:  Positive for cough. Negative for shortness of breath. Cardiovascular:  Negative for chest pain. Gastrointestinal:  Negative for heartburn. Musculoskeletal:  Negative for neck pain. Skin:  Negative for rash. Neurological:  Positive for headaches. Negative for dizziness. All other systems reviewed and are negative. 14 systems reviewed and negative except as listed in HPI. Objective:     Physical Exam  Vitals reviewed. Constitutional:       General: She is not in acute distress. Appearance: Normal appearance. She is not ill-appearing, toxic-appearing or diaphoretic. HENT:      Head: Normocephalic and atraumatic. Right Ear: Tympanic membrane and external ear normal. Tenderness present. No swelling. Tympanic membrane is not injected or erythematous. Left Ear: Tympanic membrane and external ear normal. Tenderness present. No swelling. Tympanic membrane is not injected or erythematous. Nose: Nasal tenderness and congestion present. Right Sinus: Maxillary sinus tenderness and frontal sinus tenderness present. Left Sinus: Maxillary sinus tenderness and frontal sinus tenderness present. Mouth/Throat:      Lips: Pink. Mouth: Mucous membranes are moist.      Pharynx: Oropharynx is clear. Uvula midline. Posterior oropharyngeal erythema present.  No pharyngeal swelling or oropharyngeal exudate. Eyes:      General:         Right eye: No discharge. Left eye: No discharge. Extraocular Movements: Extraocular movements intact. Conjunctiva/sclera: Conjunctivae normal.      Pupils: Pupils are equal, round, and reactive to light. Cardiovascular:      Rate and Rhythm: Normal rate and regular rhythm. Pulses: Normal pulses. Heart sounds: Normal heart sounds. No murmur heard. No friction rub. Pulmonary:      Effort: No respiratory distress. Breath sounds: Normal breath sounds. No stridor. No wheezing, rhonchi or rales. Chest:      Chest wall: No tenderness. Musculoskeletal:         General: No swelling or tenderness. Normal range of motion. Cervical back: Normal range of motion and neck supple. No rigidity. Lymphadenopathy:      Cervical: Cervical adenopathy present. Skin:     General: Skin is warm and dry. Capillary Refill: Capillary refill takes less than 2 seconds. Findings: No erythema or rash. Neurological:      Mental Status: She is alert and oriented to person, place, and time. Motor: No weakness. Coordination: Coordination normal.      Gait: Gait normal.   Psychiatric:         Mood and Affect: Mood normal.         Behavior: Behavior normal.         Thought Content: Thought content normal.         Judgment: Judgment normal.     /74 (Site: Left Upper Arm, Position: Sitting, Cuff Size: Medium Adult)   Pulse 76   Temp 98.5 °F (36.9 °C)   Wt 148 lb (67.1 kg)   LMP 11/29/2022 (Approximate)   SpO2 98%   BMI 27.07 kg/m²     Assessment:       Diagnosis Orders   1. Acute bacterial sinusitis  amoxicillin-clavulanate (AUGMENTIN) 875-125 MG per tablet    brompheniramine-pseudoephedrine-DM 2-30-10 MG/5ML syrup      2.  Acute cough  brompheniramine-pseudoephedrine-DM 2-30-10 MG/5ML syrup          Plan:   -Based on the duration and severity of the symptoms-- I will treat this as bacterial at this time.  -Patient instructed to complete antibiotic prescription fully. -May use Motrin/Tylenol for fever/pain.  -Saline washes, salt water gargles and over the counter preparations if desired.  -Instructed to increase fluid intake.   -Suggested humidifier and mist therapy.  -Avoid irritants such as smoke. -Bromfed for cough  -Patient agreeable to treatment plan.  -Educational materials provided on AVS.    Return if symptoms worsen or fail to improve. Orders Placed This Encounter   Medications    amoxicillin-clavulanate (AUGMENTIN) 875-125 MG per tablet     Sig: Take 1 tablet by mouth 2 times daily for 10 days     Dispense:  20 tablet     Refill:  0    brompheniramine-pseudoephedrine-DM 2-30-10 MG/5ML syrup     Sig: Take 5 mLs by mouth 4 times daily as needed for Cough     Dispense:  100 mL     Refill:  0         Patient given educational materials - see patient instructions. Discussed use, benefit, and side effects of prescribed medications. All patient questions answered. Pt voiced understanding.     Electronically signed by OLI Chen NP on 12/15/2022 at 8:57 AM

## 2023-02-27 ENCOUNTER — OFFICE VISIT (OUTPATIENT)
Dept: PRIMARY CARE CLINIC | Age: 48
End: 2023-02-27
Payer: COMMERCIAL

## 2023-02-27 VITALS
TEMPERATURE: 99.4 F | OXYGEN SATURATION: 98 % | SYSTOLIC BLOOD PRESSURE: 120 MMHG | HEART RATE: 68 BPM | DIASTOLIC BLOOD PRESSURE: 78 MMHG | WEIGHT: 148 LBS | BODY MASS INDEX: 27.07 KG/M2

## 2023-02-27 DIAGNOSIS — J32.9 CHRONIC SINUSITIS, UNSPECIFIED LOCATION: Primary | ICD-10-CM

## 2023-02-27 PROCEDURE — 99214 OFFICE O/P EST MOD 30 MIN: CPT | Performed by: FAMILY MEDICINE

## 2023-02-27 RX ORDER — DOXYCYCLINE HYCLATE 100 MG
100 TABLET ORAL 2 TIMES DAILY
Qty: 20 TABLET | Refills: 0 | Status: SHIPPED | OUTPATIENT
Start: 2023-02-27 | End: 2023-03-09

## 2023-02-27 RX ORDER — ALBUTEROL SULFATE 90 UG/1
2 AEROSOL, METERED RESPIRATORY (INHALATION) 4 TIMES DAILY PRN
Qty: 18 G | Refills: 0 | Status: SHIPPED | OUTPATIENT
Start: 2023-02-27

## 2023-02-27 NOTE — PROGRESS NOTES
Subjective:  Malik Mckeon presents for   Chief Complaint   Patient presents with    Nasal Congestion     Was seen in December and states that she has never really felt completely better. She has tired sudafed and mucinex, cough med, and allegra. Headache    Fatigue    Cough     She just states that she has days where she doesnt' feel goo- nothing specific not consistent    Nonproductive cough    Feels facila pressure    Left ear pressure    Laying down sometimes makes cough worse    Fluids are good    Otc do nothing    States she had asthma in the past    Smoking hx 4 pack years. Is using the flonase only        Patient Active Problem List   Diagnosis    Allergic rhinitis    Hypothyroidism following radioiodine therapy         Objective:  Physical Exam   Vitals: Wt Readings from Last 3 Encounters:   02/27/23 148 lb (67.1 kg)   12/15/22 148 lb (67.1 kg)   12/08/22 148 lb (67.1 kg)     Ht Readings from Last 3 Encounters:   08/17/20 5' 2\" (1.575 m)   01/08/18 5' 2\" (1.575 m)   11/03/16 5' 2\" (1.575 m)     Body mass index is 27.07 kg/m². Vitals:    02/27/23 0948   BP: 120/78   Site: Left Upper Arm   Position: Sitting   Cuff Size: Medium Adult   Pulse: 68   Temp: 99.4 °F (37.4 °C)   SpO2: 98%   Weight: 148 lb (67.1 kg)       Constitutional: She is oriented to person, place, and time. She appears well-developed and well-nourished and in no acute distress. Answers all my questions appropriately. Head: Normocephalic and atraumatic. Eyes:conjunctiva appear normal.    Right Ear: External ear normal. TM is clear  Left Ear: External ear normal. TM is clear    Nose: pink, non-edematous mucosa. No polyps. No septal deviation    Throat: no erythema, tonsillar hypertrophy or exudate. No ulcerations noted. Lips/Teeth/Gums all appear normal.    Neck: Normal range of motion. Neck supple. No tracheal deviation present. No abnormal lymphadenopathy. No JVD noted. Carotids are clear bilaterally.    No thyroid masses noted.    Heart: RRR without murmur. No S3, S4, or gallop noted. Chest:   Good breath sounds noted. Clear to auscultation bilaterally. No rales, wheezes, or rhonchi noted. No respiratory retractions noted. Wall has symmetrical movement with respirations. Assessment:   Encounter Diagnosis   Name Primary? Chronic sinusitis, unspecified location Yes         Plan:     Medications Discontinued During This Encounter   Medication Reason    albuterol (VENTOLIN HFA) 108 (90 BASE) MCG/ACT inhaler LIST CLEANUP     THE ABOVE NOTED DISCONTINUED MEDS MAY ONLY BE FROM 'CLEANING UP' THE MED LIST AND WERE NOT ACTUALLY CANCELED;  SEE CHART FOR DETAILS! Orders Placed This Encounter   Medications    albuterol sulfate HFA (VENTOLIN HFA) 108 (90 Base) MCG/ACT inhaler     Sig: Inhale 2 puffs into the lungs 4 times daily as needed for Wheezing     Dispense:  18 g     Refill:  0    doxycycline hyclate (VIBRA-TABS) 100 MG tablet     Sig: Take 1 tablet by mouth 2 times daily for 10 days     Dispense:  20 tablet     Refill:  0     No orders of the defined types were placed in this encounter. Return in about 2 weeks (around 3/13/2023). There are no Patient Instructions on file for this visit.   Follow up with your provider

## 2023-03-15 NOTE — TELEPHONE ENCOUNTER
Last visit: 02/27/2023  Last Med refill: 02/27/2023  Does patient have enough medication for 72 hours: yes.   Patient needs prior to April appt    Next Visit Date:04/26/2023  Future Appointments   Date Time Provider Kaushik Kinney   4/26/2023  8:15 AM OLI Avina - [address]   Topic Date Due    Depression Screen  Never done    Hepatitis C screen  Never done    DTaP/Tdap/Td vaccine (1 - Tdap) Never done    Cervical cancer screen  Never done    Lipids  Never done    Colorectal Cancer Screen  Never done    COVID-19 Vaccine (4 - Booster for Pfizer series) 02/03/2022    Flu vaccine (1) 08/01/2022    Hepatitis A vaccine  Aged Out    Hib vaccine  Aged Out    Meningococcal (ACWY) vaccine  Aged Out    Pneumococcal 0-64 years Vaccine  Aged Out    HIV screen  Discontinued       No results found for: LABA1C          ( goal A1C is < 7)   No results found for: LABMICR  No results found for: LDLCHOLESTEROL, [address]    (goal LDL is <100)   No results found for: AST, ALT, BUN, CR  BP Readings from Last 3 Encounters:   02/27/23 120/78   12/15/22 122/74   12/08/22 102/62          (goal 120/80)    All Future Testing planned in [facility]              Patient Active Problem List:     Allergic rhinitis     Hypothyroidism following radioiodine therapy

## 2023-03-17 RX ORDER — ALBUTEROL SULFATE 90 UG/1
2 AEROSOL, METERED RESPIRATORY (INHALATION) 4 TIMES DAILY PRN
Qty: 18 G | Refills: 0 | Status: SHIPPED | OUTPATIENT
Start: 2023-03-17

## 2023-04-03 ENCOUNTER — OFFICE VISIT (OUTPATIENT)
Dept: FAMILY MEDICINE CLINIC | Age: 48
End: 2023-04-03
Payer: COMMERCIAL

## 2023-04-03 ENCOUNTER — HOSPITAL ENCOUNTER (OUTPATIENT)
Dept: ULTRASOUND IMAGING | Age: 48
Discharge: HOME | End: 2023-04-05
Payer: COMMERCIAL

## 2023-04-03 ENCOUNTER — HOSPITAL ENCOUNTER (OUTPATIENT)
Dept: MAMMOGRAPHY | Age: 48
Discharge: HOME | End: 2023-04-05
Payer: COMMERCIAL

## 2023-04-03 VITALS
DIASTOLIC BLOOD PRESSURE: 68 MMHG | SYSTOLIC BLOOD PRESSURE: 112 MMHG | TEMPERATURE: 98.5 F | WEIGHT: 149 LBS | HEART RATE: 75 BPM | OXYGEN SATURATION: 97 % | HEIGHT: 62 IN | BODY MASS INDEX: 27.42 KG/M2

## 2023-04-03 DIAGNOSIS — N63.21 MASS OF UPPER OUTER QUADRANT OF LEFT BREAST: ICD-10-CM

## 2023-04-03 DIAGNOSIS — N63.21 MASS OF UPPER OUTER QUADRANT OF LEFT BREAST: Primary | ICD-10-CM

## 2023-04-03 PROCEDURE — 76642 ULTRASOUND BREAST LIMITED: CPT

## 2023-04-03 PROCEDURE — G0279 TOMOSYNTHESIS, MAMMO: HCPCS

## 2023-04-03 PROCEDURE — 99214 OFFICE O/P EST MOD 30 MIN: CPT | Performed by: NURSE PRACTITIONER

## 2023-04-03 SDOH — ECONOMIC STABILITY: FOOD INSECURITY: WITHIN THE PAST 12 MONTHS, YOU WORRIED THAT YOUR FOOD WOULD RUN OUT BEFORE YOU GOT MONEY TO BUY MORE.: NEVER TRUE

## 2023-04-03 SDOH — ECONOMIC STABILITY: HOUSING INSECURITY
IN THE LAST 12 MONTHS, WAS THERE A TIME WHEN YOU DID NOT HAVE A STEADY PLACE TO SLEEP OR SLEPT IN A SHELTER (INCLUDING NOW)?: NO

## 2023-04-03 SDOH — ECONOMIC STABILITY: FOOD INSECURITY: WITHIN THE PAST 12 MONTHS, THE FOOD YOU BOUGHT JUST DIDN'T LAST AND YOU DIDN'T HAVE MONEY TO GET MORE.: NEVER TRUE

## 2023-04-03 SDOH — ECONOMIC STABILITY: INCOME INSECURITY: HOW HARD IS IT FOR YOU TO PAY FOR THE VERY BASICS LIKE FOOD, HOUSING, MEDICAL CARE, AND HEATING?: NOT HARD AT ALL

## 2023-04-03 ASSESSMENT — PATIENT HEALTH QUESTIONNAIRE - PHQ9
SUM OF ALL RESPONSES TO PHQ QUESTIONS 1-9: 0
1. LITTLE INTEREST OR PLEASURE IN DOING THINGS: 0
SUM OF ALL RESPONSES TO PHQ9 QUESTIONS 1 & 2: 0
2. FEELING DOWN, DEPRESSED OR HOPELESS: 0
SUM OF ALL RESPONSES TO PHQ QUESTIONS 1-9: 0

## 2023-04-03 NOTE — PROGRESS NOTES
[address] [facility]   [address]  [phone]    4/3/2023     CHIEF COMPLAINT:     Graciela Cantu (:  1975) is a 50 y.o. female, here for evaluation of the following chief complaint(s):  Breast Mass (Pt noticed a lump in her left breast on 3/30/23, no pain)      REVIEWED INFORMATION      No Known Allergies    Current Outpatient Medications   Medication Sig Dispense Refill    albuterol sulfate HFA (VENTOLIN HFA) 108 (90 Base) MCG/ACT inhaler Inhale 2 puffs into the lungs 4 times daily as needed for Wheezing 18 g 0    levothyroxine (SYNTHROID) 100 MCG tablet Take by mouth daily       No current facility-administered medications for this visit. Patient Care Team:  Obi Patricio MD as PCP - General    REVIEW OF SYSTEMS:     Review of Systems   Genitourinary:  Positive for menstrual problem (abnormal menses - perimenopausal symtpom- as is irregular - mother completed menopause at age  55). All other systems reviewed and are negative. HISTORY OF PRESENT ILLNESS     Breast Problem  Chronicity:  Recurrent (she did have notiation of cyst about 4 years ago for MRI completed and noted benign - does report that it was much smaller at that time.)  Associated Symptoms: breast mass (left side 5:00 position)    Affected side:  Left  Progression since onset:  Worsening  Currently pregnant:  No  Current birth control:  None  History of hormonal contraceptive use: no    History of hormone replacement therapy: no    Practices self breast exam: yes  Risk factors: dense breasts  Diagnostic workup:  MRI scan and ultrasound  Treatments tried:  None        PHYSICAL EXAM:     /68 (Site: Right Upper Arm, Position: Sitting, Cuff Size: Medium Adult)   Pulse 75   Temp 98.5 °F (36.9 °C) (Tympanic)   Ht 5' 2\" (1.575 m)   Wt 149 lb (67.6 kg)   SpO2 97%   BMI 27.25 kg/m²      Physical Exam  Chest:      Chest wall:  Mass (approx 2 cm diameter 5:00 position) and tenderness

## 2023-04-19 NOTE — TELEPHONE ENCOUNTER
Request for Albuterol.     Last script sent: 3/17/23  Last OV: 7/3/23  Next Visit Date:  Future Appointments   Date Time Provider Kaushik Kinney   4/26/2023  8:15 AM OLI Fagan NP [address] Maintenance   Topic Date Due    Hepatitis C screen  Never done    DTaP/Tdap/Td vaccine (1 - Tdap) Never done    Cervical cancer screen  Never done    Diabetes screen  Never done    Lipids  Never done    Colorectal Cancer Screen  Never done    COVID-19 Vaccine (4 - Booster for Pfizer series) 02/03/2022    Flu vaccine (Season Ended) 08/01/2023    Depression Screen  04/03/2024    Hepatitis A vaccine  Aged Out    Hib vaccine  Aged Out    Meningococcal (ACWY) vaccine  Aged Out    Pneumococcal 0-64 years Vaccine  Aged Out    HIV screen  Discontinued       No results found for: LABA1C          ( goal A1C is < 7)   No results found for: LABMICR  No results found for: LDLCHOLESTEROL, [address]    (goal LDL is <100)   No results found for: AST, ALT, BUN, CR  BP Readings from Last 3 Encounters:   04/03/23 112/68   02/27/23 120/78   12/15/22 122/74          (goal 120/80)    All Future Testing planned in [facility]        Patient Active Problem List:     Allergic rhinitis     Hypothyroidism following radioiodine therapy

## 2023-04-20 RX ORDER — ALBUTEROL SULFATE 90 UG/1
2 AEROSOL, METERED RESPIRATORY (INHALATION) EVERY 6 HOURS PRN
Qty: 8.5 G | Refills: 1 | Status: SHIPPED | OUTPATIENT
Start: 2023-04-20

## 2023-06-05 ENCOUNTER — OFFICE VISIT (OUTPATIENT)
Dept: FAMILY MEDICINE CLINIC | Age: 48
End: 2023-06-05
Payer: COMMERCIAL

## 2023-06-05 VITALS
WEIGHT: 148 LBS | OXYGEN SATURATION: 100 % | BODY MASS INDEX: 27.23 KG/M2 | DIASTOLIC BLOOD PRESSURE: 86 MMHG | HEIGHT: 62 IN | SYSTOLIC BLOOD PRESSURE: 126 MMHG | HEART RATE: 67 BPM

## 2023-06-05 DIAGNOSIS — Z01.89 ROUTINE LAB DRAW: Primary | ICD-10-CM

## 2023-06-05 DIAGNOSIS — Z13.220 LIPID SCREENING: ICD-10-CM

## 2023-06-05 DIAGNOSIS — F32.A DEPRESSION, UNSPECIFIED DEPRESSION TYPE: ICD-10-CM

## 2023-06-05 DIAGNOSIS — Z13.1 DIABETES MELLITUS SCREENING: ICD-10-CM

## 2023-06-05 DIAGNOSIS — Z12.11 COLON CANCER SCREENING: ICD-10-CM

## 2023-06-05 DIAGNOSIS — N92.6 ABNORMAL MENSES: ICD-10-CM

## 2023-06-05 DIAGNOSIS — R06.02 SHORTNESS OF BREATH: ICD-10-CM

## 2023-06-05 DIAGNOSIS — H11.32 SUBCONJUNCTIVAL HEMORRHAGE OF LEFT EYE: ICD-10-CM

## 2023-06-05 DIAGNOSIS — Z11.59 NEED FOR HEPATITIS C SCREENING TEST: ICD-10-CM

## 2023-06-05 DIAGNOSIS — R53.83 OTHER FATIGUE: ICD-10-CM

## 2023-06-05 PROBLEM — E05.00 TOXIC DIFFUSE GOITER WITHOUT CRISIS: Status: ACTIVE | Noted: 2023-06-05

## 2023-06-05 PROCEDURE — 99214 OFFICE O/P EST MOD 30 MIN: CPT

## 2023-06-05 ASSESSMENT — PATIENT HEALTH QUESTIONNAIRE - PHQ9
SUM OF ALL RESPONSES TO PHQ QUESTIONS 1-9: 9
SUM OF ALL RESPONSES TO PHQ QUESTIONS 1-9: 9
SUM OF ALL RESPONSES TO PHQ9 QUESTIONS 1 & 2: 4
5. POOR APPETITE OR OVEREATING: 0
7. TROUBLE CONCENTRATING ON THINGS, SUCH AS READING THE NEWSPAPER OR WATCHING TELEVISION: 1
3. TROUBLE FALLING OR STAYING ASLEEP: 1
10. IF YOU CHECKED OFF ANY PROBLEMS, HOW DIFFICULT HAVE THESE PROBLEMS MADE IT FOR YOU TO DO YOUR WORK, TAKE CARE OF THINGS AT HOME, OR GET ALONG WITH OTHER PEOPLE: 0
4. FEELING TIRED OR HAVING LITTLE ENERGY: 2
9. THOUGHTS THAT YOU WOULD BE BETTER OFF DEAD, OR OF HURTING YOURSELF: 0
SUM OF ALL RESPONSES TO PHQ QUESTIONS 1-9: 9
1. LITTLE INTEREST OR PLEASURE IN DOING THINGS: 2
SUM OF ALL RESPONSES TO PHQ QUESTIONS 1-9: 9
6. FEELING BAD ABOUT YOURSELF - OR THAT YOU ARE A FAILURE OR HAVE LET YOURSELF OR YOUR FAMILY DOWN: 1
8. MOVING OR SPEAKING SO SLOWLY THAT OTHER PEOPLE COULD HAVE NOTICED. OR THE OPPOSITE, BEING SO FIGETY OR RESTLESS THAT YOU HAVE BEEN MOVING AROUND A LOT MORE THAN USUAL: 0
2. FEELING DOWN, DEPRESSED OR HOPELESS: 2

## 2023-06-05 ASSESSMENT — ANXIETY QUESTIONNAIRES
7. FEELING AFRAID AS IF SOMETHING AWFUL MIGHT HAPPEN: 0
5. BEING SO RESTLESS THAT IT IS HARD TO SIT STILL: 0
IF YOU CHECKED OFF ANY PROBLEMS ON THIS QUESTIONNAIRE, HOW DIFFICULT HAVE THESE PROBLEMS MADE IT FOR YOU TO DO YOUR WORK, TAKE CARE OF THINGS AT HOME, OR GET ALONG WITH OTHER PEOPLE: NOT DIFFICULT AT ALL
1. FEELING NERVOUS, ANXIOUS, OR ON EDGE: 1
2. NOT BEING ABLE TO STOP OR CONTROL WORRYING: 0
6. BECOMING EASILY ANNOYED OR IRRITABLE: 2
3. WORRYING TOO MUCH ABOUT DIFFERENT THINGS: 0
GAD7 TOTAL SCORE: 3
4. TROUBLE RELAXING: 0

## 2023-06-05 ASSESSMENT — ENCOUNTER SYMPTOMS
SHORTNESS OF BREATH: 1
EYE REDNESS: 1

## 2023-06-05 NOTE — PROGRESS NOTES
Omid Diaz (:  1975) is a 50 y.o. female,Established patient, here for evaluation of the following chief complaint(s): Annual Exam (Physical), Menopause, Asthma, and Eye Problem         ASSESSMENT/PLAN:  1. Routine lab draw  -     CBC with Auto Differential; Future  2. Other fatigue  -     Comprehensive Metabolic Panel, Fasting; Future  -     Vitamin B12 & Folate; Future  -     Vitamin D 25 Hydroxy; Future  3. Lipid screening  -     Lipid, Fasting; Future  4. Colon cancer screening  -     Fecal DNA Colorectal cancer screening (Cologuard)  5. Need for hepatitis C screening test  -     Hepatitis C Antibody; Future  6. Diabetes mellitus screening  -     Hemoglobin A1C; Future  7. Abnormal menses  -     Follicle Stimulating Hormone; Future  -     Comprehensive Metabolic Panel, Fasting; Future  8. Depression, unspecified depression type  9. Shortness of breath  -     XR CHEST (2 VW); Future  10. Subconjunctival hemorrhage of left eye    Complete labs as ordered  Discussed medication management vs therapy if desired. Encouraged healthy diet, low in carbs, fats, and exercise 3-4 days weekly. Return in about 4 weeks (around 7/3/2023) for physical with PAP . Subjective   SUBJECTIVE/OBJECTIVE:  Patient here to est care with new provider. She has been having irregular menses and increased depressive symptoms. She has had irregularity for the last year. Her Mom and aunts both had early menopause in the 45s. She does have hypothyroid rollowing  radioiodine therapy for hyperactive thyroid. She follows with endocrine and saw them in the last couple of months with normal TSH and free T4. She has also had weight gain despite healthy diet and exercise. She also has SOB at times. Environmental factors seems to flare up SOB. Rescue inhaler is helpful. She has never had a chest xray. Review of Systems   Constitutional:  Positive for unexpected weight change.    Eyes:  Positive for

## 2023-06-19 ENCOUNTER — HOSPITAL ENCOUNTER (OUTPATIENT)
Dept: GENERAL RADIOLOGY | Age: 48
Discharge: HOME | End: 2023-06-21
Payer: COMMERCIAL

## 2023-06-19 ENCOUNTER — HOSPITAL ENCOUNTER (OUTPATIENT)
Age: 48
Discharge: HOME | End: 2023-06-21
Payer: COMMERCIAL

## 2023-06-19 DIAGNOSIS — R06.02 SHORTNESS OF BREATH: ICD-10-CM

## 2023-06-19 PROCEDURE — 71046 X-RAY EXAM CHEST 2 VIEWS: CPT

## 2023-06-27 LAB — NONINV COLON CA DNA+OCC BLD SCRN STL QL: NEGATIVE

## 2023-07-10 ENCOUNTER — OFFICE VISIT (OUTPATIENT)
Dept: FAMILY MEDICINE CLINIC | Age: 48
End: 2023-07-10
Payer: COMMERCIAL

## 2023-07-10 ENCOUNTER — HOSPITAL ENCOUNTER (OUTPATIENT)
Age: 48
Setting detail: SPECIMEN
Discharge: HOME | End: 2023-07-10

## 2023-07-10 VITALS
WEIGHT: 150 LBS | HEIGHT: 63 IN | DIASTOLIC BLOOD PRESSURE: 84 MMHG | BODY MASS INDEX: 26.58 KG/M2 | SYSTOLIC BLOOD PRESSURE: 124 MMHG | HEART RATE: 84 BPM | TEMPERATURE: 98.4 F | OXYGEN SATURATION: 98 %

## 2023-07-10 DIAGNOSIS — Z01.419 ENCOUNTER FOR ROUTINE GYNECOLOGICAL EXAMINATION WITH PAPANICOLAOU SMEAR OF CERVIX: ICD-10-CM

## 2023-07-10 DIAGNOSIS — F41.9 ANXIETY: ICD-10-CM

## 2023-07-10 DIAGNOSIS — Z01.419 WELL WOMAN EXAM WITH ROUTINE GYNECOLOGICAL EXAM: Primary | ICD-10-CM

## 2023-07-10 PROCEDURE — 99396 PREV VISIT EST AGE 40-64: CPT

## 2023-07-10 PROCEDURE — 99214 OFFICE O/P EST MOD 30 MIN: CPT

## 2023-07-10 RX ORDER — SERTRALINE HYDROCHLORIDE 25 MG/1
TABLET, FILM COATED ORAL
Qty: 67 TABLET | Refills: 0 | Status: SHIPPED | OUTPATIENT
Start: 2023-07-10 | End: 2023-07-17 | Stop reason: SDUPTHER

## 2023-07-10 NOTE — PROGRESS NOTES
Rashard Lovelace (:  1975) is a 50 y.o. female,Established patient, here for evaluation of the following chief complaint(s):  No chief complaint on file. ASSESSMENT/PLAN:  1. Well woman exam with routine gynecological exam  2. Encounter for routine gynecological examination with Papanicolaou smear of cervix  -     PAP Smear; Future  3. Anxiety    sertraline (ZOLOFT) 25 MG tablet                 Take 1 tablet by mouth daily for 7 days, THEN 2 tablets daily. , Disp-67 tablet, R-0  Normal       Return in about 4 weeks (around 2023) for Virtual visit for Anxiety . Subjective   SUBJECTIVE/OBJECTIVE:  Patient presents for routine well woman exam with pap. She also notes today continued increased anxiety. We did review her recent labs which shows she is now menopausal.  She has had continued anxiety more recently. Previously after delivery of her twins many years ago she was on Zoloft with noted improvement of symptoms. She is inquiring about starting this back up. Review of Systems   Constitutional:  Negative for activity change, fatigue and fever. HENT:  Negative for dental problem and sore throat. Eyes:  Negative for discharge and visual disturbance. Respiratory:  Negative for cough and shortness of breath. Cardiovascular:  Negative for chest pain, palpitations and leg swelling. Gastrointestinal:  Negative for constipation, diarrhea, nausea and vomiting. Genitourinary:  Negative for difficulty urinating and dysuria. Musculoskeletal:  Negative for arthralgias and myalgias. Skin:  Negative for rash and wound. Allergic/Immunologic: Negative for environmental allergies. Neurological:  Negative for headaches. Hematological:  Does not bruise/bleed easily. Psychiatric/Behavioral:  Positive for agitation (at times). Negative for sleep disturbance. The patient is nervous/anxious. Objective   Physical Exam  Vitals reviewed.  Exam conducted with a chaperone

## 2023-07-17 DIAGNOSIS — F41.9 ANXIETY: Primary | ICD-10-CM

## 2023-07-17 LAB — CYTOLOGY REPORT: NORMAL

## 2023-07-17 RX ORDER — SERTRALINE HYDROCHLORIDE 25 MG/1
TABLET, FILM COATED ORAL
Qty: 67 TABLET | Refills: 0 | Status: SHIPPED | OUTPATIENT
Start: 2023-07-17 | End: 2023-07-21

## 2023-07-17 NOTE — TELEPHONE ENCOUNTER
Pt's insurance only allows 45 tablets for 30 days, or 135 tablets for 90 days on the Zoloft. Please order what you feel is appropriate.

## 2023-07-18 LAB
HPV I/H RISK 4 DNA CVX QL NAA+PROBE: NOT DETECTED
HPV SAMPLE: NORMAL
HPV, INTERPRETATION: NORMAL
HPV16 DNA CVX QL NAA+PROBE: NOT DETECTED
HPV18 DNA CVX QL NAA+PROBE: NOT DETECTED
SPECIMEN DESCRIPTION: NORMAL

## 2023-07-21 ENCOUNTER — PATIENT MESSAGE (OUTPATIENT)
Dept: FAMILY MEDICINE CLINIC | Age: 48
End: 2023-07-21

## 2023-07-21 RX ORDER — ALBUTEROL SULFATE 90 UG/1
AEROSOL, METERED RESPIRATORY (INHALATION)
Qty: 8.5 G | Refills: 5 | Status: SHIPPED | OUTPATIENT
Start: 2023-07-21

## 2023-07-21 RX ORDER — SERTRALINE HYDROCHLORIDE 25 MG/1
TABLET, FILM COATED ORAL
Qty: 45 TABLET | Refills: 0 | Status: SHIPPED | OUTPATIENT
Start: 2023-07-21 | End: 2023-08-16

## 2023-07-21 NOTE — TELEPHONE ENCOUNTER
Medication was still not filled correctly. Please adjust medication to patients insurance preference and what you feel is appropriate. Pt's insurance only allows 45 tablets for 30 days, or 135 tablets for 90 days on the Zoloft.

## 2023-07-24 ASSESSMENT — ENCOUNTER SYMPTOMS
VOMITING: 0
CONSTIPATION: 0
COUGH: 0
DIARRHEA: 0
EYE DISCHARGE: 0
SHORTNESS OF BREATH: 0
NAUSEA: 0
SORE THROAT: 0

## 2023-09-13 ENCOUNTER — TELEMEDICINE (OUTPATIENT)
Dept: FAMILY MEDICINE CLINIC | Age: 48
End: 2023-09-13
Payer: COMMERCIAL

## 2023-09-13 DIAGNOSIS — F41.9 ANXIETY: Primary | ICD-10-CM

## 2023-09-13 PROCEDURE — 99213 OFFICE O/P EST LOW 20 MIN: CPT

## 2023-09-13 RX ORDER — SERTRALINE HYDROCHLORIDE 25 MG/1
50 TABLET, FILM COATED ORAL DAILY
Qty: 30 TABLET | Refills: 5 | Status: CANCELLED | OUTPATIENT
Start: 2023-09-13 | End: 2024-09-13

## 2023-09-13 ASSESSMENT — PATIENT HEALTH QUESTIONNAIRE - PHQ9
10. IF YOU CHECKED OFF ANY PROBLEMS, HOW DIFFICULT HAVE THESE PROBLEMS MADE IT FOR YOU TO DO YOUR WORK, TAKE CARE OF THINGS AT HOME, OR GET ALONG WITH OTHER PEOPLE: 0
8. MOVING OR SPEAKING SO SLOWLY THAT OTHER PEOPLE COULD HAVE NOTICED. OR THE OPPOSITE, BEING SO FIGETY OR RESTLESS THAT YOU HAVE BEEN MOVING AROUND A LOT MORE THAN USUAL: 0
5. POOR APPETITE OR OVEREATING: 0
7. TROUBLE CONCENTRATING ON THINGS, SUCH AS READING THE NEWSPAPER OR WATCHING TELEVISION: 0
SUM OF ALL RESPONSES TO PHQ9 QUESTIONS 1 & 2: 0
3. TROUBLE FALLING OR STAYING ASLEEP: 0
SUM OF ALL RESPONSES TO PHQ QUESTIONS 1-9: 1
2. FEELING DOWN, DEPRESSED OR HOPELESS: 0
4. FEELING TIRED OR HAVING LITTLE ENERGY: 1
SUM OF ALL RESPONSES TO PHQ QUESTIONS 1-9: 1
1. LITTLE INTEREST OR PLEASURE IN DOING THINGS: 0
6. FEELING BAD ABOUT YOURSELF - OR THAT YOU ARE A FAILURE OR HAVE LET YOURSELF OR YOUR FAMILY DOWN: 0
SUM OF ALL RESPONSES TO PHQ QUESTIONS 1-9: 1
9. THOUGHTS THAT YOU WOULD BE BETTER OFF DEAD, OR OF HURTING YOURSELF: 0
SUM OF ALL RESPONSES TO PHQ QUESTIONS 1-9: 1

## 2023-09-13 ASSESSMENT — ANXIETY QUESTIONNAIRES
2. NOT BEING ABLE TO STOP OR CONTROL WORRYING: 0
IF YOU CHECKED OFF ANY PROBLEMS ON THIS QUESTIONNAIRE, HOW DIFFICULT HAVE THESE PROBLEMS MADE IT FOR YOU TO DO YOUR WORK, TAKE CARE OF THINGS AT HOME, OR GET ALONG WITH OTHER PEOPLE: NOT DIFFICULT AT ALL
4. TROUBLE RELAXING: 0
1. FEELING NERVOUS, ANXIOUS, OR ON EDGE: 0
7. FEELING AFRAID AS IF SOMETHING AWFUL MIGHT HAPPEN: 0
GAD7 TOTAL SCORE: 0
3. WORRYING TOO MUCH ABOUT DIFFERENT THINGS: 0
5. BEING SO RESTLESS THAT IT IS HARD TO SIT STILL: 0
6. BECOMING EASILY ANNOYED OR IRRITABLE: 0

## 2023-09-13 NOTE — PROGRESS NOTES
Marjorie Webber, was evaluated through a synchronous (real-time) audio-video encounter. The patient (or guardian if applicable) is aware that this is a billable service, which includes applicable co-pays. This Virtual Visit was conducted with patient's (and/or legal guardian's) consent. Patient identification was verified, and a caregiver was present when appropriate. The patient was located at Home: [facility]  Provider was located at [facility] ([address] ): [address] [place] [address]  [facility]      Marjorie Webber (:  1975) is a Established patient, presenting virtually for evaluation of the following:    Assessment & Plan   Below is the assessment and plan developed based on review of pertinent history, physical exam, labs, studies, and medications. 1. Anxiety  -     sertraline (ZOLOFT) 50 MG tablet; Take 1 tablet by mouth daily, Disp-90 tablet, R-1Normal      Refill sent to pharmacy. Advised patient to reach out in 2 to 3 weeks, if anxiety symptoms persist.  At the time we will increase her dose to 100mg daily. Return in about 6 months (around 3/13/2024), or if symptoms worsen or fail to improve. Subjective   Patient presents today via virtual for follow-up of her anxiety. Patient was started on Zoloft, 25 mg, and titrated up to 50 mg. She does note that she was not taking this regularly, as it was does not prove her routine. She has only been on this for about 3 weeks. She does notice some mild improvement, but is realized that she did not see the full effect since she has only been on it for 3 weeks. Anxiety  Presents for follow-up visit. Symptoms occur most days. The severity of symptoms is mild. The quality of sleep is good. Review of Systems   Psychiatric/Behavioral:          See HPI     All other systems reviewed and are negative.          Objective   Patient-Reported Vitals  No data recorded     Physical Exam  [INSTRUCTIONS:  \"[x]\" Indicates a

## 2024-01-29 ENCOUNTER — OFFICE VISIT (OUTPATIENT)
Dept: FAMILY MEDICINE CLINIC | Age: 49
End: 2024-01-29
Payer: COMMERCIAL

## 2024-01-29 ENCOUNTER — HOSPITAL ENCOUNTER (OUTPATIENT)
Age: 49
Setting detail: SPECIMEN
Discharge: HOME | End: 2024-01-29

## 2024-01-29 VITALS
HEART RATE: 68 BPM | OXYGEN SATURATION: 98 % | WEIGHT: 160 LBS | SYSTOLIC BLOOD PRESSURE: 120 MMHG | BODY MASS INDEX: 28.8 KG/M2 | TEMPERATURE: 98.7 F | DIASTOLIC BLOOD PRESSURE: 84 MMHG

## 2024-01-29 DIAGNOSIS — E89.0 HYPOTHYROIDISM FOLLOWING RADIOIODINE THERAPY: Primary | ICD-10-CM

## 2024-01-29 DIAGNOSIS — N95.1 MENOPAUSAL SYMPTOMS: ICD-10-CM

## 2024-01-29 DIAGNOSIS — E89.0 HYPOTHYROIDISM FOLLOWING RADIOIODINE THERAPY: ICD-10-CM

## 2024-01-29 LAB
T4 FREE SERPL-MCNC: 1.6 NG/DL (ref 0.9–1.7)
TSH SERPL DL<=0.05 MIU/L-ACNC: 1.62 UIU/ML (ref 0.3–5)

## 2024-01-29 PROCEDURE — 99214 OFFICE O/P EST MOD 30 MIN: CPT

## 2024-01-29 ASSESSMENT — ENCOUNTER SYMPTOMS
DIARRHEA: 0
SHORTNESS OF BREATH: 0
EYE DISCHARGE: 0
COUGH: 0
NAUSEA: 0
VOMITING: 0
SORE THROAT: 0
CONSTIPATION: 0

## 2024-01-29 NOTE — PROGRESS NOTES
Mery Medel (:  1975) is a 48 y.o. female,Established patient, here for evaluation of the following chief complaint(s):  Menopause (Hot flashes, mood swings, and other symptoms)         ASSESSMENT/PLAN:  1. Hypothyroidism following radioiodine therapy  -     TSH; Future  -     T4, Free; Future  2. Menopausal symptoms  -     Aleksandra Bonilla, STEVE, OB/GYN, [place]    Discussed the use of OTC medications and no FDA guidelines to support or advise.   Encouraged to reach out to endocrine before starting   Discussed HRT possibility, and she would like to talk to OB.  Also discussed minimal supporting studies for compounded HRT and need to seek specialist to discuss.  She would like to do so.   Complete thyroid labs as ordered.   Alteration to POC pending lab review.     Return if symptoms worsen or fail to improve.         Subjective   SUBJECTIVE/OBJECTIVE:  Patient presents today with ongoing concerns of menopausal symptoms.  Patient has hot flashes, weakness, weight gain, and feels like she is having some ADHD symptoms.  She is having hard time remembering things.  She has purchased some over-the-counter supplements, but is apprehensive to take given her history of thyroid disease and thyroid hormone placement.  Endo did decrease her synthroid last visit. She would like to take something for her menopausal symptoms, however more natural if possible.  She has not seen OB/GYN for the symptoms.  She was on Zoloft, for some anxiety, however tells me this was not helpful at all.  She is feeling discouraged, given that weight gain and mental fog. The weight gain has been most bothersome as she has been trying hard to lose weight and continues to gain.    She has not had her thyroid labs done since last Feb.          Review of Systems   Constitutional:  Positive for fatigue and unexpected weight change (weight gain). Negative for activity change and fever.   HENT:  Negative for dental problem and sore

## 2024-02-13 ENCOUNTER — OFFICE VISIT (OUTPATIENT)
Dept: PRIMARY CARE CLINIC | Age: 49
End: 2024-02-13
Payer: COMMERCIAL

## 2024-02-13 VITALS
WEIGHT: 154 LBS | SYSTOLIC BLOOD PRESSURE: 122 MMHG | HEART RATE: 74 BPM | BODY MASS INDEX: 27.72 KG/M2 | DIASTOLIC BLOOD PRESSURE: 68 MMHG | TEMPERATURE: 98.8 F | OXYGEN SATURATION: 98 %

## 2024-02-13 DIAGNOSIS — R52 BODY ACHES: ICD-10-CM

## 2024-02-13 DIAGNOSIS — B96.89 ACUTE BACTERIAL SINUSITIS: Primary | ICD-10-CM

## 2024-02-13 DIAGNOSIS — R05.1 ACUTE COUGH: ICD-10-CM

## 2024-02-13 DIAGNOSIS — J01.90 ACUTE BACTERIAL SINUSITIS: Primary | ICD-10-CM

## 2024-02-13 DIAGNOSIS — R09.81 NASAL CONGESTION: ICD-10-CM

## 2024-02-13 LAB
INFLUENZA A ANTIBODY: NEGATIVE
INFLUENZA B ANTIBODY: NEGATIVE

## 2024-02-13 PROCEDURE — 99213 OFFICE O/P EST LOW 20 MIN: CPT | Performed by: PHYSICIAN ASSISTANT

## 2024-02-13 PROCEDURE — 87804 INFLUENZA ASSAY W/OPTIC: CPT | Performed by: PHYSICIAN ASSISTANT

## 2024-02-13 RX ORDER — LORATADINE 10 MG/1
10 TABLET ORAL DAILY
Qty: 30 TABLET | Refills: 0 | Status: SHIPPED | OUTPATIENT
Start: 2024-02-13

## 2024-02-13 RX ORDER — AMOXICILLIN AND CLAVULANATE POTASSIUM 875; 125 MG/1; MG/1
1 TABLET, FILM COATED ORAL 2 TIMES DAILY
Qty: 20 TABLET | Refills: 0 | Status: SHIPPED | OUTPATIENT
Start: 2024-02-13 | End: 2024-02-23

## 2024-02-13 RX ORDER — PREDNISONE 20 MG/1
20 TABLET ORAL 2 TIMES DAILY
Qty: 10 TABLET | Refills: 0 | Status: SHIPPED | OUTPATIENT
Start: 2024-02-13 | End: 2024-02-18

## 2024-02-13 RX ORDER — FLUTICASONE PROPIONATE 50 MCG
1 SPRAY, SUSPENSION (ML) NASAL DAILY
Qty: 16 G | Refills: 0 | Status: SHIPPED | OUTPATIENT
Start: 2024-02-13

## 2024-02-14 NOTE — PROGRESS NOTES
[facility]  [address] [place] [address]  Phone: [phone]  Fax: [phone]       [facility] WALK - IN    Pt Name: Mery Medel  MRN: [id number]  Birthdate 1975  Date of evaluation: 2/13/2024  Provider: Shayla Smith PA-C     CHIEF COMPLAINT       Chief Complaint   Patient presents with    Generalized Body Aches     Body aches, cough, nasal congestion, fatigue, bilateral ear pressure, diarrhea for 4 days. Denies sore throat, nausea, vomiting, sob, or chest pain.            HISTORY OF PRESENT ILLNESS  (Location/Symptom, Timing/Onset, Context/Setting, Quality, Duration, Modifying Factors, Severity.)   Mery Medel is a 48 y.o. White (non-) [1] female who presents to the office for evaluation of      Sinus congestion started about 7 to 10 days ago.  Over the weekend the patient states that she began to have diarrhea    Generalized Body Aches  This is a new problem. The current episode started 1 to 4 weeks ago. Associated symptoms include chills, congestion, coughing, fatigue, headaches and myalgias.       Nursing Notes were reviewed.    REVIEW OF SYSTEMS    (2-9 systems for level 4, 10 or more for level 5)     Review of Systems   Constitutional:  Positive for chills and fatigue.   HENT:  Positive for congestion, postnasal drip, sinus pressure and sinus pain.    Eyes: Negative.    Respiratory:  Positive for cough.    Cardiovascular: Negative.    Gastrointestinal: Negative.    Genitourinary: Negative.    Musculoskeletal:  Positive for myalgias.   Neurological:  Positive for headaches.         Except as noted above the remainder of the review of systems was reviewed andnegative.       PAST MEDICAL HISTORY   History reviewed.    Past Medical History:   Diagnosis Date    Allergic rhinitis     Hypothyroidism following radioiodine therapy     Sinusitis          SURGICAL HISTORY     History reviewed.  No past surgical history on file.      CURRENT MEDICATIONS       Current Outpatient

## 2024-03-18 ENCOUNTER — OFFICE VISIT (OUTPATIENT)
Dept: OBGYN CLINIC | Age: 49
End: 2024-03-18
Payer: COMMERCIAL

## 2024-03-18 VITALS
HEIGHT: 63 IN | BODY MASS INDEX: 27.46 KG/M2 | SYSTOLIC BLOOD PRESSURE: 116 MMHG | WEIGHT: 155 LBS | DIASTOLIC BLOOD PRESSURE: 78 MMHG

## 2024-03-18 DIAGNOSIS — N95.1 MENOPAUSAL SYMPTOMS: Primary | ICD-10-CM

## 2024-03-18 DIAGNOSIS — Z12.31 BREAST CANCER SCREENING BY MAMMOGRAM: ICD-10-CM

## 2024-03-18 DIAGNOSIS — F41.9 ANXIETY: ICD-10-CM

## 2024-03-18 PROCEDURE — 99203 OFFICE O/P NEW LOW 30 MIN: CPT | Performed by: ADVANCED PRACTICE MIDWIFE

## 2024-03-18 RX ORDER — PAROXETINE 10 MG/1
TABLET, FILM COATED ORAL
Qty: 90 TABLET | Refills: 1 | Status: SHIPPED | OUTPATIENT
Start: 2024-03-18

## 2024-03-18 RX ORDER — PAROXETINE 10 MG/1
10 TABLET, FILM COATED ORAL DAILY
Qty: 30 TABLET | Refills: 3 | Status: CANCELLED | OUTPATIENT
Start: 2024-03-18

## 2024-03-18 ASSESSMENT — ENCOUNTER SYMPTOMS
EYES NEGATIVE: 1
ALLERGIC/IMMUNOLOGIC NEGATIVE: 1
RESPIRATORY NEGATIVE: 1
GASTROINTESTINAL NEGATIVE: 1

## 2024-03-18 NOTE — PROGRESS NOTES
Pt is here today to discuss menopause symptoms. She is c/o hot flashes, mood swings and irritability.   
(LMP 7/2023).   Musculoskeletal: Negative.    Allergic/Immunologic: Negative.    Neurological: Negative.    Hematological: Negative.    Psychiatric/Behavioral:  Positive for sleep disturbance. The patient is nervous/anxious.        Objective:  Vitals:    03/18/24 1300   BP: 116/78   Weight: 70.3 kg (155 lb)   Height: 1.588 m (5' 2.5\")       Patient's last menstrual period was 07/15/2023.  Natural menopause: Not yet  - has not been 12 mo since LMP    History reviewed. No pertinent family history.                                                                        Last pap: 7/2023 ASCUS HPV negative  Last mammogram: 4/2023  Last DEXA scan: NA  Last colonoscopy: Cologuard negative 6/2023    Smoker: No  History of HTN: No    ASSESSMENT/PLAN:    Menopausal symptoms  Mery was counseled on all forms of Hormone Replacement Therapy along with non-hormonal based options that could potentially alleviate her menopausal symptoms. She ultimately decided on Paxil for vasomotor symptoms of menopause and to assist with anxiety.   Reviewed diagnosis of menopause is 12 months of no menstrual cycle, will see if she reaches menopausal status by 7/2024. Reviewed menses changes during perimenopausal period.   PARoxetine (PAXIL) 10 MG tablet; Take 1/2 tablet by mouth every morning for 14 days then take 1 tablet by mouth every morning.  Reviewed use and side effects. Starting at low dose for patient comfort. RTO in 3 months for med check/dose increase as needed    Breast cancer screening by mammogram  Next mammogram due 4/2024 - next annual 7/2024  TAMMIE DACIA DIGITAL SCREEN BILATERAL; Future        Return in about 3 months (around 6/18/2024) for med check .    The patient, Mery Medel,  was seen with a total time spent of 35 minutes for the visit on this date of service [facility]  The time component involved both face-to-face (counseling and education) and non face-to-face time (care coordination), spent in determining the total time

## 2024-05-13 DIAGNOSIS — J30.9 ALLERGIC RHINITIS, UNSPECIFIED SEASONALITY, UNSPECIFIED TRIGGER: Primary | ICD-10-CM

## 2024-05-13 DIAGNOSIS — R06.02 SHORTNESS OF BREATH: ICD-10-CM

## 2024-05-13 NOTE — TELEPHONE ENCOUNTER
LOV 1/29/24  LRF 12/20/23  RTO     Health Maintenance   Topic Date Due    DTaP/Tdap/Td vaccine (1 - Tdap) 07/10/2024 (Originally 8/27/1994)    COVID-19 Vaccine (4 - 2023-24 season) 12/27/2024 (Originally 9/1/2023)    Hepatitis B vaccine (1 of 3 - 3-dose series) 01/29/2025 (Originally 1975)    Flu vaccine (Season Ended) 01/29/2025 (Originally 8/1/2024)    Shingles vaccine (1 of 2) 01/29/2025 (Originally 8/27/2023)    Depression Monitoring  09/13/2024    Breast cancer screen  04/03/2025    Diabetes screen  06/12/2026    Colorectal Cancer Screen  06/19/2026    Lipids  06/12/2028    Cervical cancer screen  07/10/2028    Hepatitis C screen  Completed    Hepatitis A vaccine  Aged Out    Hib vaccine  Aged Out    Polio vaccine  Aged Out    Meningococcal (ACWY) vaccine  Aged Out    Pneumococcal 0-64 years Vaccine  Aged Out    Depression Screen  Discontinued    HIV screen  Discontinued             (applicable per patient's age: Cancer Screenings, Depression Screening, Fall Risk Screening, Immunizations)    Hemoglobin A1C (%)   Date Value   06/12/2023 5.3     AST (U/L)   Date Value   06/12/2023 14     ALT (U/L)   Date Value   06/12/2023 8     BUN (mg/dL)   Date Value   06/12/2023 10      (goal A1C is < 7)   (goal LDL is <100) need 30-50% reduction from baseline     BP Readings from Last 3 Encounters:   03/18/24 116/78   02/13/24 122/68   01/29/24 120/84    (goal /80)      All Future Testing planned in [facility]:  Lab Frequency Next Occurrence   TAMMIE DACIA DIGITAL SCREEN BILATERAL Once 03/18/2024       Next Visit Date:  Future Appointments   Date Time Provider Department Center   6/17/2024  8:50 AM Marry Castillo APRN - CNM [facility]            Patient Active Problem List:     Allergic rhinitis     Hypothyroidism following radioiodine therapy     Toxic diffuse goiter without crisis     Anxiety     Menopausal symptoms

## 2024-05-16 RX ORDER — ALBUTEROL SULFATE 90 UG/1
AEROSOL, METERED RESPIRATORY (INHALATION)
Qty: 8.5 G | Refills: 5 | Status: SHIPPED | OUTPATIENT
Start: 2024-05-16

## 2024-06-24 ENCOUNTER — OFFICE VISIT (OUTPATIENT)
Dept: OBGYN CLINIC | Age: 49
End: 2024-06-24
Payer: COMMERCIAL

## 2024-06-24 VITALS
HEART RATE: 91 BPM | WEIGHT: 156.2 LBS | SYSTOLIC BLOOD PRESSURE: 108 MMHG | BODY MASS INDEX: 28.11 KG/M2 | DIASTOLIC BLOOD PRESSURE: 66 MMHG

## 2024-06-24 DIAGNOSIS — N95.1 MENOPAUSAL SYMPTOMS: Primary | ICD-10-CM

## 2024-06-24 DIAGNOSIS — F41.9 ANXIETY: ICD-10-CM

## 2024-06-24 PROCEDURE — 99214 OFFICE O/P EST MOD 30 MIN: CPT | Performed by: ADVANCED PRACTICE MIDWIFE

## 2024-06-24 RX ORDER — PAROXETINE HYDROCHLORIDE 20 MG/1
20 TABLET, FILM COATED ORAL DAILY
Qty: 90 TABLET | Refills: 2 | Status: SHIPPED | OUTPATIENT
Start: 2024-06-24

## 2024-06-24 ASSESSMENT — ENCOUNTER SYMPTOMS
GASTROINTESTINAL NEGATIVE: 1
EYES NEGATIVE: 1
RESPIRATORY NEGATIVE: 1
ALLERGIC/IMMUNOLOGIC NEGATIVE: 1

## 2024-06-24 NOTE — PROGRESS NOTES
Patient is here for medication check. Paxil ?     Pxil does help w/ sx but per pt thinks maybe increase dose and would like to discuss options   
       Improved sleep disturbance and anxiety       Objective:  Vitals:    06/24/24 0932   BP: 108/66   Pulse: 91   Weight: 70.9 kg (156 lb 3.2 oz)       Patient's last menstrual period was 04/20/2024.  Natural menopause: No (perimenopausal)  Surgical menopause:No     History reviewed. No pertinent family history.                                                                        Last pap: 7/2023 ASCUS HPV negative  Last mammogram: 4/2023  Last DEXA scan: NA  Last colonoscopy: Cologuard negative 6/2023     Smoker: No  History of HTN: No    ASSESSMENT/PLAN:    Symptomatic perimenopausal female, anxiety  Mery feels improvement in sleep disturbance and anxiety with Paxil 10 mg daily, she would like to increase dose to 20 mg daily. Vasomotor symptoms have continued but are minimal, reviewed they may decrease further with increased Paxil dosing. She will monitor and reach out with concerns.   Given refills, recommend completing scheduled mammogram and returning to office for annual exam. She is agreeable with this plan.   Reviewed menopause dx is 12 months with no menses. At this time she is perimenopausal. Reviewed warning signs to report.       Return for Annual exam.    The patient, Mery Medel,  was seen with a total time spent of 35 minutes for the visit on this date of service [facility]  The time component involved both face-to-face (counseling and education) and non face-to-face time (care coordination), spent in determining the total time component.      Electronically signed by: OLI Garcia CNM

## 2024-09-19 DIAGNOSIS — R06.02 SHORTNESS OF BREATH: ICD-10-CM

## 2024-09-19 DIAGNOSIS — J30.9 ALLERGIC RHINITIS, UNSPECIFIED SEASONALITY, UNSPECIFIED TRIGGER: ICD-10-CM

## 2024-09-19 RX ORDER — ALBUTEROL SULFATE 90 UG/1
INHALANT RESPIRATORY (INHALATION)
Qty: 8.5 EACH | Refills: 2 | Status: SHIPPED | OUTPATIENT
Start: 2024-09-19

## 2024-11-11 ENCOUNTER — OFFICE VISIT (OUTPATIENT)
Dept: FAMILY MEDICINE CLINIC | Age: 49
End: 2024-11-11

## 2024-11-11 VITALS
SYSTOLIC BLOOD PRESSURE: 118 MMHG | OXYGEN SATURATION: 98 % | HEART RATE: 72 BPM | WEIGHT: 163 LBS | HEIGHT: 62 IN | DIASTOLIC BLOOD PRESSURE: 76 MMHG | TEMPERATURE: 98 F | BODY MASS INDEX: 30 KG/M2

## 2024-11-11 DIAGNOSIS — J30.9 ALLERGIC RHINITIS, UNSPECIFIED SEASONALITY, UNSPECIFIED TRIGGER: ICD-10-CM

## 2024-11-11 DIAGNOSIS — R06.02 SHORTNESS OF BREATH: ICD-10-CM

## 2024-11-11 DIAGNOSIS — Z23 NEED FOR TDAP VACCINATION: ICD-10-CM

## 2024-11-11 DIAGNOSIS — F41.9 ANXIETY: ICD-10-CM

## 2024-11-11 DIAGNOSIS — Z00.00 ENCOUNTER FOR MEDICAL EXAMINATION TO ESTABLISH CARE: Primary | ICD-10-CM

## 2024-11-11 DIAGNOSIS — E89.0 HYPOTHYROIDISM FOLLOWING RADIOIODINE THERAPY: ICD-10-CM

## 2024-11-11 DIAGNOSIS — N95.1 MENOPAUSAL SYMPTOMS: ICD-10-CM

## 2024-11-11 RX ORDER — ALBUTEROL SULFATE 90 UG/1
2 INHALANT RESPIRATORY (INHALATION) EVERY 6 HOURS PRN
Qty: 8.5 EACH | Refills: 2 | Status: SHIPPED | OUTPATIENT
Start: 2024-11-11 | End: 2025-11-11

## 2024-11-11 SDOH — ECONOMIC STABILITY: INCOME INSECURITY: HOW HARD IS IT FOR YOU TO PAY FOR THE VERY BASICS LIKE FOOD, HOUSING, MEDICAL CARE, AND HEATING?: NOT HARD AT ALL

## 2024-11-11 SDOH — ECONOMIC STABILITY: FOOD INSECURITY: WITHIN THE PAST 12 MONTHS, YOU WORRIED THAT YOUR FOOD WOULD RUN OUT BEFORE YOU GOT MONEY TO BUY MORE.: NEVER TRUE

## 2024-11-11 SDOH — ECONOMIC STABILITY: FOOD INSECURITY: WITHIN THE PAST 12 MONTHS, THE FOOD YOU BOUGHT JUST DIDN'T LAST AND YOU DIDN'T HAVE MONEY TO GET MORE.: NEVER TRUE

## 2024-11-11 ASSESSMENT — ANXIETY QUESTIONNAIRES
4. TROUBLE RELAXING: NOT AT ALL
5. BEING SO RESTLESS THAT IT IS HARD TO SIT STILL: NOT AT ALL
2. NOT BEING ABLE TO STOP OR CONTROL WORRYING: NOT AT ALL
IF YOU CHECKED OFF ANY PROBLEMS ON THIS QUESTIONNAIRE, HOW DIFFICULT HAVE THESE PROBLEMS MADE IT FOR YOU TO DO YOUR WORK, TAKE CARE OF THINGS AT HOME, OR GET ALONG WITH OTHER PEOPLE: NOT DIFFICULT AT ALL
1. FEELING NERVOUS, ANXIOUS, OR ON EDGE: NEARLY EVERY DAY
GAD7 TOTAL SCORE: 4
3. WORRYING TOO MUCH ABOUT DIFFERENT THINGS: NOT AT ALL
6. BECOMING EASILY ANNOYED OR IRRITABLE: SEVERAL DAYS
7. FEELING AFRAID AS IF SOMETHING AWFUL MIGHT HAPPEN: NOT AT ALL

## 2024-11-11 ASSESSMENT — PATIENT HEALTH QUESTIONNAIRE - PHQ9
7. TROUBLE CONCENTRATING ON THINGS, SUCH AS READING THE NEWSPAPER OR WATCHING TELEVISION: NOT AT ALL
1. LITTLE INTEREST OR PLEASURE IN DOING THINGS: NOT AT ALL
SUM OF ALL RESPONSES TO PHQ QUESTIONS 1-9: 0
5. POOR APPETITE OR OVEREATING: NOT AT ALL
10. IF YOU CHECKED OFF ANY PROBLEMS, HOW DIFFICULT HAVE THESE PROBLEMS MADE IT FOR YOU TO DO YOUR WORK, TAKE CARE OF THINGS AT HOME, OR GET ALONG WITH OTHER PEOPLE: NOT DIFFICULT AT ALL
4. FEELING TIRED OR HAVING LITTLE ENERGY: NOT AT ALL
2. FEELING DOWN, DEPRESSED OR HOPELESS: NOT AT ALL
6. FEELING BAD ABOUT YOURSELF - OR THAT YOU ARE A FAILURE OR HAVE LET YOURSELF OR YOUR FAMILY DOWN: NOT AT ALL
8. MOVING OR SPEAKING SO SLOWLY THAT OTHER PEOPLE COULD HAVE NOTICED. OR THE OPPOSITE, BEING SO FIGETY OR RESTLESS THAT YOU HAVE BEEN MOVING AROUND A LOT MORE THAN USUAL: NOT AT ALL
SUM OF ALL RESPONSES TO PHQ QUESTIONS 1-9: 0
3. TROUBLE FALLING OR STAYING ASLEEP: NOT AT ALL
9. THOUGHTS THAT YOU WOULD BE BETTER OFF DEAD, OR OF HURTING YOURSELF: NOT AT ALL
SUM OF ALL RESPONSES TO PHQ QUESTIONS 1-9: 0
SUM OF ALL RESPONSES TO PHQ QUESTIONS 1-9: 0
SUM OF ALL RESPONSES TO PHQ9 QUESTIONS 1 & 2: 0

## 2024-11-11 ASSESSMENT — ENCOUNTER SYMPTOMS
DIARRHEA: 0
VOMITING: 0
GASTROINTESTINAL NEGATIVE: 1
CONSTIPATION: 0
COUGH: 0
SORE THROAT: 0
NAUSEA: 0
RESPIRATORY NEGATIVE: 1
EYE DISCHARGE: 0
SHORTNESS OF BREATH: 0

## 2024-11-11 NOTE — PROGRESS NOTES
[facility]  [facility] PRIMARY CARE [address] [address]  Dept: [phone]    CHIEF COMPLAINT:      Chief Complaint   Patient presents with    Menopause     Patient would like to talk about menopause    Established New Doctor     Previous [facility] patient       ASSESSMENT & PLAN      1. Encounter for medical examination to establish care  2. Anxiety  3. Menopausal symptoms  4. Hypothyroidism following radioiodine therapy  5. Need for Tdap vaccination  -     Tdap, BOOSTRIX, (age 10 yrs+), IM  6. Allergic rhinitis, unspecified seasonality, unspecified trigger  -     albuterol sulfate HFA (PROVENTIL;VENTOLIN;PROAIR) 108 (90 Base) MCG/ACT inhaler; Inhale 2 puffs into the lungs every 6 hours as needed for Wheezing or Shortness of Breath, Disp-8.5 each, R-2Normal  7. Shortness of breath  -     albuterol sulfate HFA (PROVENTIL;VENTOLIN;PROAIR) 108 (90 Base) MCG/ACT inhaler; Inhale 2 puffs into the lungs every 6 hours as needed for Wheezing or Shortness of Breath, Disp-8.5 each, R-2Normal     Return in about 3 months (around 2/11/2025) for hot flashes/anxiety.     Encourage to take an additional vitamin D of at least 2000 IU daily to help with fatigue, and menopause symptoms  Patient to start taking the women's one a day MVI for menopause  Continue to take paxil as ordered may increase if symptoms don't improve.   Encourage to complete mammogram.    I spent a total of 30 minutes on the date of the service which included preparing to see the patient, face-to-face patient care, completing clinical documentation, obtaining and/or reviewing separately obtained history, performing a medically appropriate examination, counseling and educating the patient/family/caregiver and ordering medications, tests, or procedures.     SUBJECTIVE/OBJECTIVE   Mery Medel is a 49 y.o. female who presents to establish previously seen by Madhavi Olvera.     Past medical history includes hypothyroidism (s/p

## 2024-12-30 DIAGNOSIS — R06.02 SHORTNESS OF BREATH: ICD-10-CM

## 2024-12-30 DIAGNOSIS — J30.9 ALLERGIC RHINITIS, UNSPECIFIED SEASONALITY, UNSPECIFIED TRIGGER: ICD-10-CM

## 2024-12-30 NOTE — TELEPHONE ENCOUNTER
LOV 11/11/24   RTO 11/3/25 cmw  LRF 11/11/24          Controlled Substance Monitoring:    Acute and Chronic Pain Monitoring:        No data to display

## 2024-12-31 RX ORDER — ALBUTEROL SULFATE 90 UG/1
2 INHALANT RESPIRATORY (INHALATION) EVERY 6 HOURS PRN
Qty: 8.5 EACH | Refills: 2 | Status: SHIPPED | OUTPATIENT
Start: 2024-12-31 | End: 2025-12-31

## 2025-01-02 DIAGNOSIS — Z12.31 BREAST CANCER SCREENING BY MAMMOGRAM: ICD-10-CM

## 2025-01-20 ENCOUNTER — OFFICE VISIT (OUTPATIENT)
Dept: OBGYN CLINIC | Age: 50
End: 2025-01-20
Payer: COMMERCIAL

## 2025-01-20 VITALS
SYSTOLIC BLOOD PRESSURE: 126 MMHG | HEIGHT: 62 IN | WEIGHT: 167.1 LBS | HEART RATE: 60 BPM | BODY MASS INDEX: 30.75 KG/M2 | DIASTOLIC BLOOD PRESSURE: 80 MMHG

## 2025-01-20 DIAGNOSIS — F41.9 ANXIETY: ICD-10-CM

## 2025-01-20 DIAGNOSIS — E34.9 ENDOCRINE DISORDER, UNSPECIFIED: ICD-10-CM

## 2025-01-20 DIAGNOSIS — N95.1 MENOPAUSAL SYMPTOMS: Primary | ICD-10-CM

## 2025-01-20 PROCEDURE — 99215 OFFICE O/P EST HI 40 MIN: CPT | Performed by: ADVANCED PRACTICE MIDWIFE

## 2025-01-20 RX ORDER — CITALOPRAM HYDROBROMIDE 10 MG/1
TABLET ORAL
Qty: 70 TABLET | Refills: 0 | Status: SHIPPED | OUTPATIENT
Start: 2025-01-20 | End: 2025-03-01

## 2025-01-20 NOTE — PROGRESS NOTES
Patient is here for medication check on paxil.   Patient stated she stopped taking paxil due to not working.   
Comments: Tearful discussing sx        Assessment/Plan:    Menopausal symptoms  -     Reviewed treatment options, [facility]. Preferred lab panel ordered. Encouraged to call for new patient consultation and get labs drawn 7-14 days prior. Would like to start Celexa in the mean time since they are booking out 2 months.   -     citalopram (CELEXA) 10 MG tablet; Take 1 tablet by mouth daily for 10 days, THEN 2 tablets daily.  -     Estradiol; Future  -     Estrone; Future  -     Progesterone; Future  -     Testosterone Free Bio Total; Future  -     Sex Hormone Binding Globulin; Future  -     DHEA-Sulfate; Future  -     Cortisol Total; Future  -     Vitamin D 25 Hydroxy; Future    Anxiety  -     citalopram (CELEXA) 10 MG tablet; Take 1 tablet by mouth daily for 10 days, THEN 2 tablets daily.    Endocrine disorder, unspecified  -     Estradiol; Future  -     Estrone; Future  -     Progesterone; Future  -     Testosterone Free Bio Total; Future  -     Sex Hormone Binding Globulin; Future  -     DHEA-Sulfate; Future  -     Cortisol Total; Future  -     Vitamin D 25 Hydroxy; Future        Return in about 3 months (around 4/20/2025) for Annual exam.    Problem list reviewed and updated as indicated.   Upon completion of the visit all questions were answered.  History was reviewed as documented on Epic Navigator.    The patient, Mery Medel,  was seen with a total time spent of 42 minutes for the visit on this date of service [facility]  The time component involved both face-to-face (counseling and education) and non face-to-face time (care coordination), spent in determining the total time component.

## 2025-02-13 DIAGNOSIS — J30.9 ALLERGIC RHINITIS, UNSPECIFIED SEASONALITY, UNSPECIFIED TRIGGER: ICD-10-CM

## 2025-02-13 DIAGNOSIS — R06.02 SHORTNESS OF BREATH: ICD-10-CM

## 2025-02-13 RX ORDER — ALBUTEROL SULFATE 90 UG/1
2 INHALANT RESPIRATORY (INHALATION) EVERY 6 HOURS PRN
Qty: 8.5 EACH | Refills: 2 | Status: SHIPPED | OUTPATIENT
Start: 2025-02-13 | End: 2026-02-13

## 2025-02-13 NOTE — TELEPHONE ENCOUNTER
LOV 01/20/2025   RTO 11/3/25  LRF 12/31/2024            Controlled Substance Monitoring:    Acute and Chronic Pain Monitoring:        No data to display

## 2025-02-24 ENCOUNTER — HOSPITAL ENCOUNTER (OUTPATIENT)
Age: 50
Setting detail: SPECIMEN
Discharge: HOME | End: 2025-02-24

## 2025-02-24 LAB
25(OH)D3 SERPL-MCNC: 31.7 NG/ML (ref 30–100)
ALBUMIN SERPL-MCNC: 4 G/DL (ref 3.5–5.2)
ALBUMIN/GLOB SERPL: 1.8 {RATIO} (ref 1–2.5)
ALP SERPL-CCNC: 76 U/L (ref 35–104)
ALT SERPL-CCNC: 13 U/L (ref 10–35)
ANION GAP SERPL CALCULATED.3IONS-SCNC: 10 MMOL/L (ref 9–16)
AST SERPL-CCNC: 22 U/L (ref 10–35)
BASOPHILS # BLD: 0.06 K/UL (ref 0–0.2)
BASOPHILS NFR BLD: 1 % (ref 0–2)
BILIRUB SERPL-MCNC: 0.6 MG/DL (ref 0–1.2)
BUN SERPL-MCNC: 7 MG/DL (ref 6–20)
CALCIUM SERPL-MCNC: 9.2 MG/DL (ref 8.6–10.4)
CHLORIDE SERPL-SCNC: 104 MMOL/L (ref 98–107)
CO2 SERPL-SCNC: 25 MMOL/L (ref 20–31)
CREAT SERPL-MCNC: 0.9 MG/DL (ref 0.6–0.9)
EOSINOPHIL # BLD: 0.25 K/UL (ref 0–0.44)
EOSINOPHILS RELATIVE PERCENT: 4 % (ref 1–4)
ERYTHROCYTE [DISTWIDTH] IN BLOOD BY AUTOMATED COUNT: 12.8 % (ref 11.8–14.4)
GFR, ESTIMATED: 77 ML/MIN/1.73M2
GLUCOSE SERPL-MCNC: 113 MG/DL (ref 74–99)
HCT VFR BLD AUTO: 40 % (ref 36.3–47.1)
HGB BLD-MCNC: 13 G/DL (ref 11.9–15.1)
IMM GRANULOCYTES # BLD AUTO: <0.03 K/UL (ref 0–0.3)
IMM GRANULOCYTES NFR BLD: 0 %
LYMPHOCYTES NFR BLD: 1.86 K/UL (ref 1.1–3.7)
LYMPHOCYTES RELATIVE PERCENT: 27 % (ref 24–43)
MCH RBC QN AUTO: 30.4 PG (ref 25.2–33.5)
MCHC RBC AUTO-ENTMCNC: 32.5 G/DL (ref 28.4–34.8)
MCV RBC AUTO: 93.7 FL (ref 82.6–102.9)
MONOCYTES NFR BLD: 0.37 K/UL (ref 0.1–1.2)
MONOCYTES NFR BLD: 5 % (ref 3–12)
NEUTROPHILS NFR BLD: 63 % (ref 36–65)
NEUTS SEG NFR BLD: 4.39 K/UL (ref 1.5–8.1)
NRBC BLD-RTO: 0 PER 100 WBC
PLATELET # BLD AUTO: 311 K/UL (ref 138–453)
PMV BLD AUTO: 10.7 FL (ref 8.1–13.5)
POTASSIUM SERPL-SCNC: 4.1 MMOL/L (ref 3.7–5.3)
PROT SERPL-MCNC: 6.2 G/DL (ref 6.6–8.7)
RBC # BLD AUTO: 4.27 M/UL (ref 3.95–5.11)
SODIUM SERPL-SCNC: 139 MMOL/L (ref 136–145)
T4 FREE SERPL-MCNC: 1.6 NG/DL (ref 0.9–1.7)
TSH SERPL DL<=0.05 MIU/L-ACNC: 0.58 UIU/ML (ref 0.27–4.2)
WBC OTHER # BLD: 6.9 K/UL (ref 3.5–11.3)

## 2025-03-13 DIAGNOSIS — N95.1 MENOPAUSAL SYMPTOMS: ICD-10-CM

## 2025-03-13 DIAGNOSIS — F41.9 ANXIETY: ICD-10-CM

## 2025-03-13 NOTE — TELEPHONE ENCOUNTER
Mery Medel is requesting a refill on 3/13/25.     Last Annual visit:  n/a  Last office visit: 1/20/25  Next Office visit:  none scheduled     Currently Pregnant n/a  Last OB visit: n/a  Next OB visit: n/a    Last prescribing provider:  Marry WHALEN

## 2025-03-14 DIAGNOSIS — N95.1 MENOPAUSAL SYMPTOMS: Primary | ICD-10-CM

## 2025-03-14 DIAGNOSIS — F41.9 ANXIETY: ICD-10-CM

## 2025-03-14 RX ORDER — CITALOPRAM HYDROBROMIDE 10 MG/1
TABLET ORAL
Qty: 70 TABLET | Refills: 0 | OUTPATIENT
Start: 2025-03-14

## 2025-03-14 RX ORDER — CITALOPRAM HYDROBROMIDE 20 MG/1
20 TABLET ORAL DAILY
Qty: 90 TABLET | Refills: 1 | Status: SHIPPED | OUTPATIENT
Start: 2025-03-14

## 2025-03-14 NOTE — TELEPHONE ENCOUNTER
Sending new script celexa 20 mg daily  Was recommended to schedule annual 3 months from initial RX

## 2025-03-26 DIAGNOSIS — R06.02 SHORTNESS OF BREATH: ICD-10-CM

## 2025-03-26 DIAGNOSIS — J30.9 ALLERGIC RHINITIS, UNSPECIFIED SEASONALITY, UNSPECIFIED TRIGGER: ICD-10-CM

## 2025-03-26 RX ORDER — ALBUTEROL SULFATE 90 UG/1
INHALANT RESPIRATORY (INHALATION)
Refills: 2 | OUTPATIENT
Start: 2025-03-26

## 2025-03-29 DIAGNOSIS — J30.9 ALLERGIC RHINITIS, UNSPECIFIED SEASONALITY, UNSPECIFIED TRIGGER: ICD-10-CM

## 2025-03-29 DIAGNOSIS — R06.02 SHORTNESS OF BREATH: ICD-10-CM

## 2025-03-31 RX ORDER — ALBUTEROL SULFATE 90 UG/1
INHALANT RESPIRATORY (INHALATION)
Qty: 18 G | Refills: 5 | Status: SHIPPED | OUTPATIENT
Start: 2025-03-31

## 2025-03-31 NOTE — TELEPHONE ENCOUNTER
LOV: 3/24/2025    RTO:   Future Appointments   Date Time Provider Department Center   11/3/2025  8:30 AM Edith Jackson MD [facility] ECC DEP     LRF: 2/13/2025          Controlled Substance Monitoring:    Acute and Chronic Pain Monitoring:        No data to display

## 2025-05-05 DIAGNOSIS — J30.9 ALLERGIC RHINITIS, UNSPECIFIED SEASONALITY, UNSPECIFIED TRIGGER: ICD-10-CM

## 2025-05-05 DIAGNOSIS — R06.02 SHORTNESS OF BREATH: ICD-10-CM

## 2025-05-05 RX ORDER — ALBUTEROL SULFATE 90 UG/1
INHALANT RESPIRATORY (INHALATION)
Refills: 2 | OUTPATIENT
Start: 2025-05-05

## 2025-05-23 ENCOUNTER — OFFICE VISIT (OUTPATIENT)
Dept: FAMILY MEDICINE CLINIC | Age: 50
End: 2025-05-23
Payer: COMMERCIAL

## 2025-05-23 VITALS
DIASTOLIC BLOOD PRESSURE: 68 MMHG | BODY MASS INDEX: 28.71 KG/M2 | HEIGHT: 62 IN | TEMPERATURE: 99.3 F | OXYGEN SATURATION: 96 % | HEART RATE: 83 BPM | SYSTOLIC BLOOD PRESSURE: 118 MMHG | WEIGHT: 156 LBS

## 2025-05-23 DIAGNOSIS — J01.90 ACUTE BACTERIAL SINUSITIS: Primary | ICD-10-CM

## 2025-05-23 DIAGNOSIS — B96.89 ACUTE BACTERIAL SINUSITIS: Primary | ICD-10-CM

## 2025-05-23 DIAGNOSIS — R19.7 DIARRHEA, UNSPECIFIED TYPE: ICD-10-CM

## 2025-05-23 PROCEDURE — 99213 OFFICE O/P EST LOW 20 MIN: CPT

## 2025-05-23 RX ORDER — CEFDINIR 300 MG/1
300 CAPSULE ORAL 2 TIMES DAILY
Qty: 10 CAPSULE | Refills: 0 | Status: SHIPPED | OUTPATIENT
Start: 2025-05-23 | End: 2025-05-28

## 2025-05-23 ASSESSMENT — ENCOUNTER SYMPTOMS
SINUS COMPLAINT: 1
DIARRHEA: 1
TROUBLE SWALLOWING: 0
RECTAL PAIN: 0
ABDOMINAL PAIN: 0
VOMITING: 0
SORE THROAT: 0
SINUS PRESSURE: 1
ANAL BLEEDING: 0
SHORTNESS OF BREATH: 0
NAUSEA: 0
COUGH: 1
RHINORRHEA: 1
BLOOD IN STOOL: 0
VOICE CHANGE: 0

## 2025-05-23 NOTE — PROGRESS NOTES
Mery Medel (:  1975) is a 49 y.o. female,Established patient, here for evaluation of the following chief complaint(s):  Sinus Problem (Started 1 week ago, lots of pressure, ears hurt, runny and congestion, headaches, ) and Diarrhea (For 2 weeks, watery, yellowish, has had colitis in past, after every meal, no pain or cramping)      Assessment & Plan  Acute bacterial sinusitis   Acute condition, new, Supportive care with appropriate antipyretics and fluids.  - Discussed antibiotic use until completed as well as continue allergy medication.   - Informed patient of possible stool color changes.   Orders:    cefdinir (OMNICEF) 300 MG capsule; Take 1 capsule by mouth 2 times daily for 5 days    Diarrhea, unspecified type   Acute condition, new, Supportive care with appropriate antipyretics and fluids.  - Discussed possible need for GI consult and work up in the future.   - Encouraged the use of Pepcid and BRAT diet.   Orders:    Gastrointestinal Panel, Molecular; Future      Return if symptoms worsen or fail to improve.    Subjective   49-year-old female presents for acute care sinus congestion and diarrhea.    Diarrhea began 2 weeks ago and has consistently occurring after every meal.  She reports it is watery, denies a smell change and blood.  She denies any abdominal pain, rectal pain, rectal bleeding.  She reports there is no change in her diet or activity.  She had abdominal pain about 2 months ago, was treated with Pepcid, and symptoms resolved.  She has a history of colitis a few years ago.  She reports there was severe abdominal pain at that time.  Father was recently diagnosed with ulcerative colitis.  She denies any travel outside of the country, but does report that she was in [place] over a month ago, and did swim in the ocean at that time.  She denies drinking any unclean water.    Sinus pain began last weekend, and has worsened over the last week.  She also notes cough, congestion, ear and nose

## 2025-05-27 ENCOUNTER — TELEPHONE (OUTPATIENT)
Dept: FAMILY MEDICINE CLINIC | Age: 50
End: 2025-05-27

## 2025-05-27 NOTE — TELEPHONE ENCOUNTER
Spoke with the patient in regards to Asha's message. Patient will be coming in to the office tomorrow.

## 2025-05-27 NOTE — TELEPHONE ENCOUNTER
Patient is contacting the office today because she started Omnicef on Friday and it is only a 5 days and is on her last doses today and is not feeling better but is feeling worse. Patient the sinus pain and pressure, and the cough is not any better either. Patient asking if a different ATB would be appropriate    [facility]

## 2025-05-28 ENCOUNTER — OFFICE VISIT (OUTPATIENT)
Dept: FAMILY MEDICINE CLINIC | Age: 50
End: 2025-05-28
Payer: COMMERCIAL

## 2025-05-28 VITALS
SYSTOLIC BLOOD PRESSURE: 126 MMHG | DIASTOLIC BLOOD PRESSURE: 80 MMHG | HEART RATE: 73 BPM | WEIGHT: 159 LBS | TEMPERATURE: 99 F | BODY MASS INDEX: 29.08 KG/M2 | OXYGEN SATURATION: 98 %

## 2025-05-28 DIAGNOSIS — B96.89 ACUTE BACTERIAL SINUSITIS: Primary | ICD-10-CM

## 2025-05-28 DIAGNOSIS — J01.90 ACUTE BACTERIAL SINUSITIS: Primary | ICD-10-CM

## 2025-05-28 PROCEDURE — 99213 OFFICE O/P EST LOW 20 MIN: CPT

## 2025-05-28 ASSESSMENT — ENCOUNTER SYMPTOMS
SINUS PAIN: 1
SINUS PRESSURE: 1
RHINORRHEA: 0
FACIAL SWELLING: 0
SORE THROAT: 0
VOICE CHANGE: 1

## 2025-05-28 NOTE — PROGRESS NOTES
Mery Medel (:  1975) is a 49 y.o. female,Established patient, here for evaluation of the following chief complaint(s):  Sinusitis (Still not feeling well, finished antibiotic )      Assessment & Plan  Acute bacterial sinusitis   Acute condition, recurrent, ATB and Allergy Medication  - Augmentin for 5 days, making it 10 days of two strong antibiotics. If pain and congestion do not resolve, will complete a sinus XR for evaluation of abscess or other blockage in the sinuses. Discussed XR at this time, and it was declined.   - Encouraged patient to continue allegra use while taking antibiotic, which was stopped during the last course of treatment. Also encouraged nasal irrigation with sertile bottled water or saline, and continue with Flonase after irrigation.     Orders:    amoxicillin-clavulanate (AUGMENTIN) 875-125 MG per tablet; Take 1 tablet by mouth 2 times daily for 5 days    Understanding and agreement was voiced with all above plans. All questions answered to satisfaction. Call office with any questions or new or worsening symptoms or concerns.       Return if symptoms worsen or fail to improve.    Gail       HPI    Was seen by writer on 2025 for acute bacterial sinusitis and treated with omnicef due to concurrent diarrhea, as well as OTC allergy medications. She reports her last bowel movements have been a little more solid, and she has been unable to complete the stool sample due to issues with being able to drop it off and being able to collect it when not at home.     Sinus pain and pressure continue, and she reports congestion has worsened. She feels like it is all in her head and nothing is moving out. She reports no improvement with Omnicef, but completed the antibiotic. She also notes she stopped taking Allegra with Omnicef, and did not start taking Flonase until a couple days ago. Denies fevers, chills, rhinorrhea, sneezing, and sore throat. Denies any facial swelling.

## 2025-05-29 ENCOUNTER — HOSPITAL ENCOUNTER (OUTPATIENT)
Age: 50
Setting detail: SPECIMEN
Discharge: HOME | End: 2025-05-29

## 2025-05-29 DIAGNOSIS — R19.7 DIARRHEA, UNSPECIFIED TYPE: ICD-10-CM

## 2025-05-30 ENCOUNTER — RESULTS FOLLOW-UP (OUTPATIENT)
Dept: FAMILY MEDICINE CLINIC | Age: 50
End: 2025-05-30

## 2025-05-30 LAB
CAMPYLOBACTER DNA SPEC NAA+PROBE: NORMAL
ETEC ELTA+ESTB GENES STL QL NAA+PROBE: NORMAL
P SHIGELLOIDES DNA STL QL NAA+PROBE: NORMAL
SALMONELLA DNA SPEC QL NAA+PROBE: NORMAL
SHIGA TOXIN STX GENE SPEC NAA+PROBE: NORMAL
SHIGELLA DNA SPEC QL NAA+PROBE: NORMAL
SPECIMEN DESCRIPTION: NORMAL
V CHOL+PARA RFBL+TRKH+TNAA STL QL NAA+PR: NORMAL
Y ENTERO RECN STL QL NAA+PROBE: NORMAL

## 2025-06-03 DIAGNOSIS — B96.89 ACUTE BACTERIAL SINUSITIS: Primary | ICD-10-CM

## 2025-06-03 DIAGNOSIS — J01.90 ACUTE BACTERIAL SINUSITIS: Primary | ICD-10-CM

## 2025-06-03 DIAGNOSIS — J30.9 ALLERGIC RHINITIS, UNSPECIFIED SEASONALITY, UNSPECIFIED TRIGGER: ICD-10-CM

## 2025-07-14 DIAGNOSIS — J30.9 ALLERGIC RHINITIS, UNSPECIFIED SEASONALITY, UNSPECIFIED TRIGGER: ICD-10-CM

## 2025-07-14 DIAGNOSIS — R06.02 SHORTNESS OF BREATH: ICD-10-CM

## 2025-07-14 RX ORDER — ALBUTEROL SULFATE 90 UG/1
INHALANT RESPIRATORY (INHALATION)
Qty: 18 G | Refills: 5 | Status: SHIPPED | OUTPATIENT
Start: 2025-07-14

## 2025-07-14 NOTE — TELEPHONE ENCOUNTER
LOV: 5/28/2025    RTO:   Future Appointments   Date Time Provider Department Center   11/3/2025  8:30 AM Edith Jackson MD [facility] DEP     LRF: 3/31/25          Controlled Substance Monitoring:    Acute and Chronic Pain Monitoring:        No data to display

## 2025-07-28 ENCOUNTER — HOSPITAL ENCOUNTER (OUTPATIENT)
Age: 50
Setting detail: SPECIMEN
Discharge: HOME | End: 2025-07-28

## 2025-07-28 ENCOUNTER — OFFICE VISIT (OUTPATIENT)
Dept: FAMILY MEDICINE CLINIC | Age: 50
End: 2025-07-28
Payer: COMMERCIAL

## 2025-07-28 VITALS
TEMPERATURE: 99.4 F | WEIGHT: 157 LBS | DIASTOLIC BLOOD PRESSURE: 72 MMHG | HEART RATE: 65 BPM | OXYGEN SATURATION: 97 % | BODY MASS INDEX: 28.89 KG/M2 | HEIGHT: 62 IN | SYSTOLIC BLOOD PRESSURE: 108 MMHG

## 2025-07-28 DIAGNOSIS — R19.7 DIARRHEA, UNSPECIFIED TYPE: ICD-10-CM

## 2025-07-28 DIAGNOSIS — R10.12 LEFT UPPER QUADRANT PAIN: ICD-10-CM

## 2025-07-28 DIAGNOSIS — R50.9 LOW GRADE FEVER: ICD-10-CM

## 2025-07-28 DIAGNOSIS — R19.7 DIARRHEA, UNSPECIFIED TYPE: Primary | ICD-10-CM

## 2025-07-28 LAB
ERYTHROCYTE [DISTWIDTH] IN BLOOD BY AUTOMATED COUNT: 12.3 % (ref 11.8–14.4)
HCT VFR BLD AUTO: 37.3 % (ref 36.3–47.1)
HGB BLD-MCNC: 12.2 G/DL (ref 11.9–15.1)
MAGNESIUM SERPL-MCNC: 2 MG/DL (ref 1.6–2.6)
MCH RBC QN AUTO: 29.9 PG (ref 25.2–33.5)
MCHC RBC AUTO-ENTMCNC: 32.7 G/DL (ref 28.4–34.8)
MCV RBC AUTO: 91.4 FL (ref 82.6–102.9)
NRBC BLD-RTO: 0 PER 100 WBC
PLATELET # BLD AUTO: 375 K/UL (ref 138–453)
PMV BLD AUTO: 10.3 FL (ref 8.1–13.5)
RBC # BLD AUTO: 4.08 M/UL (ref 3.95–5.11)
WBC OTHER # BLD: 7.3 K/UL (ref 3.5–11.3)

## 2025-07-28 PROCEDURE — 99213 OFFICE O/P EST LOW 20 MIN: CPT | Performed by: REGISTERED NURSE

## 2025-07-28 SDOH — ECONOMIC STABILITY: FOOD INSECURITY: WITHIN THE PAST 12 MONTHS, YOU WORRIED THAT YOUR FOOD WOULD RUN OUT BEFORE YOU GOT MONEY TO BUY MORE.: NEVER TRUE

## 2025-07-28 SDOH — ECONOMIC STABILITY: FOOD INSECURITY: WITHIN THE PAST 12 MONTHS, THE FOOD YOU BOUGHT JUST DIDN'T LAST AND YOU DIDN'T HAVE MONEY TO GET MORE.: NEVER TRUE

## 2025-07-28 ASSESSMENT — ENCOUNTER SYMPTOMS
SORE THROAT: 0
COUGH: 0
SHORTNESS OF BREATH: 0
ABDOMINAL PAIN: 1
DIARRHEA: 1
CONSTIPATION: 0
SINUS PRESSURE: 0
NAUSEA: 0
VOMITING: 0

## 2025-07-28 ASSESSMENT — PATIENT HEALTH QUESTIONNAIRE - PHQ9
2. FEELING DOWN, DEPRESSED OR HOPELESS: NOT AT ALL
SUM OF ALL RESPONSES TO PHQ QUESTIONS 1-9: 0
1. LITTLE INTEREST OR PLEASURE IN DOING THINGS: NOT AT ALL
SUM OF ALL RESPONSES TO PHQ QUESTIONS 1-9: 0

## 2025-07-28 NOTE — PROGRESS NOTES
[facility] PRIMARY [address] [address]  Dept: [phone]    CHIEF COMPLAINT:      Chief Complaint   Patient presents with    GI Problem     Stomach pain, diarrhea after everything she eats x2 weeks         ASSESSMENT & PLAN     1. Diarrhea, unspecified type  -     Clostridium Difficile Toxin/Antigen; Future  -     Comprehensive Metabolic Panel  -     Magnesium; Future  -     CBC; Future  -     AFL - Merritt Velasquez MD, Gastroenterology, [place]  2. Left upper quadrant pain  -     Clostridium Difficile Toxin/Antigen; Future  -     Comprehensive Metabolic Panel  -     Magnesium; Future  -     CBC; Future  -     AFL - Merritt Velasquez MD, Gastroenter[facility], [place]  3. Low grade fever  -     Clostridium Difficile Toxin/Antigen; Future  -     CBC; Future  -     REYES - Merritt Velasquez MD, Gastroenterology, [place]     Return if symptoms worsen or fail to improve.       SUBJECTIVE/OBJECTIVE   Mery Medel is a 49 y.o. female who presents with ongoing diarrhea, she has history of colitis, she states that whenever she eats she has diarrhea. Her father was diagnosed with Ulcerative Colitis in his 70's. She gets achining pains at times.     She has been treated two different antibiotics for bacterial sinusitis. With Omnicef for the first 5 days then switched to Augmentin for 5 days. She reports the stools are watery, doesn't really notice a change in odor, she does have low grade fever. She is concerned because her dad was diagnosed so late in life tht she could have an underlying issue as well.          Patient Care Team:  Ervin Mckeon APRN - CNP as PCP - General (Family Medicine)  Ervin Mckeon APRN - CNP as PCP - Empaneled Provider        Review of Systems   Constitutional:  Negative for activity change, appetite change, fever and unexpected weight change.   HENT:  Negative for congestion, ear pain, sinus pressure and sore throat.    Respiratory:  Negative for cough and

## 2025-07-29 ENCOUNTER — HOSPITAL ENCOUNTER (OUTPATIENT)
Age: 50
Setting detail: SPECIMEN
Discharge: HOME | End: 2025-07-29

## 2025-07-29 DIAGNOSIS — R50.9 LOW GRADE FEVER: ICD-10-CM

## 2025-07-29 DIAGNOSIS — R10.12 LEFT UPPER QUADRANT PAIN: ICD-10-CM

## 2025-07-29 DIAGNOSIS — R19.7 DIARRHEA, UNSPECIFIED TYPE: ICD-10-CM

## 2025-07-30 LAB
C DIFF GDH + TOXINS A+B STL QL IA.RAPID: NEGATIVE
SPECIMEN DESCRIPTION: NORMAL